# Patient Record
Sex: FEMALE | Race: WHITE | Employment: FULL TIME | URBAN - NONMETROPOLITAN AREA
[De-identification: names, ages, dates, MRNs, and addresses within clinical notes are randomized per-mention and may not be internally consistent; named-entity substitution may affect disease eponyms.]

---

## 2017-06-17 ENCOUNTER — HOSPITAL ENCOUNTER (EMERGENCY)
Age: 32
Discharge: HOME | End: 2017-06-17
Attending: EMERGENCY MEDICINE
Payer: COMMERCIAL

## 2017-06-17 VITALS
HEART RATE: 68 BPM | HEIGHT: 62 IN | SYSTOLIC BLOOD PRESSURE: 122 MMHG | WEIGHT: 130 LBS | BODY MASS INDEX: 23.92 KG/M2 | DIASTOLIC BLOOD PRESSURE: 87 MMHG | RESPIRATION RATE: 18 BRPM | OXYGEN SATURATION: 100 % | TEMPERATURE: 97.8 F

## 2017-06-17 DIAGNOSIS — S01.81XA FOREHEAD LACERATION, INITIAL ENCOUNTER: ICD-10-CM

## 2017-06-17 DIAGNOSIS — S09.90XA CLOSED HEAD INJURY, INITIAL ENCOUNTER: Primary | ICD-10-CM

## 2017-06-17 PROCEDURE — 6360000002 HC RX W HCPCS: Performed by: EMERGENCY MEDICINE

## 2017-06-17 PROCEDURE — 90715 TDAP VACCINE 7 YRS/> IM: CPT | Performed by: EMERGENCY MEDICINE

## 2017-06-17 PROCEDURE — 6370000000 HC RX 637 (ALT 250 FOR IP): Performed by: EMERGENCY MEDICINE

## 2017-06-17 PROCEDURE — 90471 IMMUNIZATION ADMIN: CPT | Performed by: EMERGENCY MEDICINE

## 2017-06-17 PROCEDURE — 12002 RPR S/N/AX/GEN/TRNK2.6-7.5CM: CPT

## 2017-06-17 PROCEDURE — 99282 EMERGENCY DEPT VISIT SF MDM: CPT

## 2017-06-17 RX ORDER — IBUPROFEN 600 MG/1
600 TABLET ORAL ONCE
Status: COMPLETED | OUTPATIENT
Start: 2017-06-17 | End: 2017-06-17

## 2017-06-17 RX ORDER — PROMETHAZINE HYDROCHLORIDE 25 MG/1
25 TABLET ORAL ONCE
Status: COMPLETED | OUTPATIENT
Start: 2017-06-17 | End: 2017-06-17

## 2017-06-17 RX ADMIN — IBUPROFEN 600 MG: 600 TABLET, FILM COATED ORAL at 02:29

## 2017-06-17 RX ADMIN — TETANUS TOXOID, REDUCED DIPHTHERIA TOXOID AND ACELLULAR PERTUSSIS VACCINE, ADSORBED 0.5 ML: 5; 2.5; 8; 8; 2.5 SUSPENSION INTRAMUSCULAR at 02:18

## 2017-06-17 RX ADMIN — PROMETHAZINE HYDROCHLORIDE 25 MG: 25 TABLET ORAL at 02:17

## 2017-06-17 ASSESSMENT — PAIN DESCRIPTION - DESCRIPTORS: DESCRIPTORS: BURNING;THROBBING

## 2017-06-17 ASSESSMENT — ENCOUNTER SYMPTOMS
GASTROINTESTINAL NEGATIVE: 1
ALLERGIC/IMMUNOLOGIC NEGATIVE: 1
EYES NEGATIVE: 1
RESPIRATORY NEGATIVE: 1

## 2017-06-17 ASSESSMENT — PAIN SCALES - GENERAL
PAINLEVEL_OUTOF10: 5
PAINLEVEL_OUTOF10: 5

## 2017-06-17 ASSESSMENT — PAIN DESCRIPTION - PAIN TYPE: TYPE: ACUTE PAIN

## 2017-06-17 ASSESSMENT — PAIN DESCRIPTION - LOCATION: LOCATION: HEAD

## 2017-06-22 ENCOUNTER — HOSPITAL ENCOUNTER (OUTPATIENT)
Dept: CT IMAGING | Age: 32
Discharge: HOME | End: 2017-06-22
Payer: COMMERCIAL

## 2017-06-22 DIAGNOSIS — R51.9 WORSENING HEADACHES: ICD-10-CM

## 2017-06-22 DIAGNOSIS — S09.90XA HEAD INJURY, INITIAL ENCOUNTER: ICD-10-CM

## 2017-06-22 PROCEDURE — 6360000004 HC RX CONTRAST MEDICATION: Performed by: NURSE PRACTITIONER

## 2017-06-22 PROCEDURE — 70470 CT HEAD/BRAIN W/O & W/DYE: CPT

## 2017-06-22 RX ADMIN — IOPAMIDOL 100 ML: 612 INJECTION, SOLUTION INTRAVENOUS at 15:09

## 2017-09-12 ENCOUNTER — OFFICE VISIT (OUTPATIENT)
Dept: OBGYN | Age: 32
End: 2017-09-12
Payer: COMMERCIAL

## 2017-09-12 VITALS
HEIGHT: 67 IN | BODY MASS INDEX: 21.5 KG/M2 | WEIGHT: 137 LBS | SYSTOLIC BLOOD PRESSURE: 130 MMHG | DIASTOLIC BLOOD PRESSURE: 75 MMHG

## 2017-09-12 DIAGNOSIS — F41.9 ANXIOUS MOOD: ICD-10-CM

## 2017-09-12 DIAGNOSIS — Z01.419 ROUTINE GYNECOLOGICAL EXAMINATION: Primary | ICD-10-CM

## 2017-09-12 PROCEDURE — 99395 PREV VISIT EST AGE 18-39: CPT | Performed by: ADVANCED PRACTICE MIDWIFE

## 2017-09-12 RX ORDER — SERTRALINE HYDROCHLORIDE 25 MG/1
25 TABLET, FILM COATED ORAL DAILY
Qty: 30 TABLET | Refills: 12 | Status: SHIPPED | OUTPATIENT
Start: 2017-09-12 | End: 2018-08-20 | Stop reason: CLARIF

## 2017-09-12 ASSESSMENT — PATIENT HEALTH QUESTIONNAIRE - PHQ9
SUM OF ALL RESPONSES TO PHQ QUESTIONS 1-9: 0
SUM OF ALL RESPONSES TO PHQ9 QUESTIONS 1 & 2: 0
2. FEELING DOWN, DEPRESSED OR HOPELESS: 0
1. LITTLE INTEREST OR PLEASURE IN DOING THINGS: 0

## 2018-05-10 ENCOUNTER — OFFICE VISIT (OUTPATIENT)
Dept: OBGYN | Age: 33
End: 2018-05-10
Payer: COMMERCIAL

## 2018-05-10 VITALS
DIASTOLIC BLOOD PRESSURE: 70 MMHG | HEIGHT: 63 IN | BODY MASS INDEX: 25.12 KG/M2 | WEIGHT: 141.8 LBS | SYSTOLIC BLOOD PRESSURE: 112 MMHG

## 2018-05-10 DIAGNOSIS — N63.0 BREAST MASS IN FEMALE: Primary | ICD-10-CM

## 2018-05-10 PROCEDURE — 99213 OFFICE O/P EST LOW 20 MIN: CPT | Performed by: ADVANCED PRACTICE MIDWIFE

## 2018-05-10 PROCEDURE — G8427 DOCREV CUR MEDS BY ELIG CLIN: HCPCS | Performed by: ADVANCED PRACTICE MIDWIFE

## 2018-05-10 PROCEDURE — 1036F TOBACCO NON-USER: CPT | Performed by: ADVANCED PRACTICE MIDWIFE

## 2018-05-10 PROCEDURE — G8417 CALC BMI ABV UP PARAM F/U: HCPCS | Performed by: ADVANCED PRACTICE MIDWIFE

## 2018-05-11 ENCOUNTER — HOSPITAL ENCOUNTER (OUTPATIENT)
Dept: ULTRASOUND IMAGING | Age: 33
Discharge: HOME | End: 2018-05-13
Payer: COMMERCIAL

## 2018-05-11 ENCOUNTER — HOSPITAL ENCOUNTER (OUTPATIENT)
Dept: WOMENS IMAGING | Age: 33
Discharge: HOME | End: 2018-05-13
Payer: COMMERCIAL

## 2018-05-11 DIAGNOSIS — N63.0 BREAST MASS IN FEMALE: ICD-10-CM

## 2018-05-11 PROCEDURE — 77065 DX MAMMO INCL CAD UNI: CPT

## 2018-05-11 PROCEDURE — 76641 ULTRASOUND BREAST COMPLETE: CPT

## 2018-06-27 RX ORDER — NORGESTIMATE AND ETHINYL ESTRADIOL 0.25-0.035
1 KIT ORAL DAILY
Qty: 1 PACKET | Refills: 12 | Status: SHIPPED | OUTPATIENT
Start: 2018-06-27 | End: 2018-08-20

## 2018-08-20 ENCOUNTER — HOSPITAL ENCOUNTER (OUTPATIENT)
Age: 33
Setting detail: SPECIMEN
Discharge: HOME | End: 2018-08-20
Payer: COMMERCIAL

## 2018-08-20 ENCOUNTER — INITIAL PRENATAL (OUTPATIENT)
Dept: OBGYN | Age: 33
End: 2018-08-20
Payer: COMMERCIAL

## 2018-08-20 VITALS — WEIGHT: 140 LBS | BODY MASS INDEX: 24.8 KG/M2 | SYSTOLIC BLOOD PRESSURE: 112 MMHG | DIASTOLIC BLOOD PRESSURE: 72 MMHG

## 2018-08-20 DIAGNOSIS — N91.2 AMENORRHEA: ICD-10-CM

## 2018-08-20 DIAGNOSIS — Z34.91 ENCOUNTER FOR SUPERVISION OF NORMAL PREGNANCY IN FIRST TRIMESTER, UNSPECIFIED GRAVIDITY: ICD-10-CM

## 2018-08-20 DIAGNOSIS — Z32.01 POSITIVE URINE PREGNANCY TEST: ICD-10-CM

## 2018-08-20 DIAGNOSIS — N91.2 AMENORRHEA: Primary | ICD-10-CM

## 2018-08-20 LAB
ABO/RH: NORMAL
AMPHETAMINE SCREEN URINE: NEGATIVE
ANTIBODY SCREEN: NEGATIVE
BARBITURATE SCREEN URINE: NEGATIVE
BENZODIAZEPINE SCREEN, URINE: NEGATIVE
BUPRENORPHINE URINE: NEGATIVE
CANNABINOID SCREEN URINE: NEGATIVE
COCAINE METABOLITE, URINE: NEGATIVE
CONTROL: ABNORMAL
MDMA URINE: NORMAL
METHADONE SCREEN, URINE: NEGATIVE
METHAMPHETAMINE, URINE: NEGATIVE
OPIATES, URINE: NEGATIVE
OXYCODONE SCREEN URINE: NEGATIVE
PHENCYCLIDINE, URINE: NEGATIVE
PREGNANCY TEST URINE, POC: POSITIVE
PROPOXYPHENE, URINE: NEGATIVE
TEST INFORMATION: NORMAL
TRICYCLIC ANTIDEPRESSANTS, UR: NEGATIVE

## 2018-08-20 PROCEDURE — 80306 DRUG TEST PRSMV INSTRMNT: CPT

## 2018-08-20 PROCEDURE — 85025 COMPLETE CBC W/AUTO DIFF WBC: CPT

## 2018-08-20 PROCEDURE — 87086 URINE CULTURE/COLONY COUNT: CPT

## 2018-08-20 PROCEDURE — 86762 RUBELLA ANTIBODY: CPT

## 2018-08-20 PROCEDURE — 87491 CHLMYD TRACH DNA AMP PROBE: CPT

## 2018-08-20 PROCEDURE — 86900 BLOOD TYPING SEROLOGIC ABO: CPT

## 2018-08-20 PROCEDURE — 87591 N.GONORRHOEAE DNA AMP PROB: CPT

## 2018-08-20 PROCEDURE — 86850 RBC ANTIBODY SCREEN: CPT

## 2018-08-20 PROCEDURE — 0500F INITIAL PRENATAL CARE VISIT: CPT | Performed by: ADVANCED PRACTICE MIDWIFE

## 2018-08-20 PROCEDURE — 86803 HEPATITIS C AB TEST: CPT

## 2018-08-20 PROCEDURE — 87340 HEPATITIS B SURFACE AG IA: CPT

## 2018-08-20 PROCEDURE — 86780 TREPONEMA PALLIDUM: CPT

## 2018-08-20 PROCEDURE — 87389 HIV-1 AG W/HIV-1&-2 AB AG IA: CPT

## 2018-08-20 PROCEDURE — 86901 BLOOD TYPING SEROLOGIC RH(D): CPT

## 2018-08-20 PROCEDURE — 36415 COLL VENOUS BLD VENIPUNCTURE: CPT | Performed by: ADVANCED PRACTICE MIDWIFE

## 2018-08-20 ASSESSMENT — PATIENT HEALTH QUESTIONNAIRE - PHQ9
2. FEELING DOWN, DEPRESSED OR HOPELESS: 0
SUM OF ALL RESPONSES TO PHQ9 QUESTIONS 1 & 2: 0
SUM OF ALL RESPONSES TO PHQ QUESTIONS 1-9: 0
1. LITTLE INTEREST OR PLEASURE IN DOING THINGS: 0
SUM OF ALL RESPONSES TO PHQ QUESTIONS 1-9: 0

## 2018-08-21 LAB
ABSOLUTE EOS #: 0.08 K/UL (ref 0–0.44)
ABSOLUTE IMMATURE GRANULOCYTE: <0.03 K/UL (ref 0–0.3)
ABSOLUTE LYMPH #: 1.76 K/UL (ref 1.1–3.7)
ABSOLUTE MONO #: 0.75 K/UL (ref 0.1–1.2)
BASOPHILS # BLD: 0 % (ref 0–2)
BASOPHILS ABSOLUTE: <0.03 K/UL (ref 0–0.2)
CULTURE: NORMAL
DIFFERENTIAL TYPE: NORMAL
EOSINOPHILS RELATIVE PERCENT: 1 % (ref 1–4)
HCT VFR BLD CALC: 37.6 % (ref 36.3–47.1)
HEMOGLOBIN: 12.5 G/DL (ref 11.9–15.1)
HEPATITIS B SURFACE ANTIGEN: NONREACTIVE
HEPATITIS C ANTIBODY: NONREACTIVE
HIV AG/AB: NONREACTIVE
IMMATURE GRANULOCYTES: 0 %
LYMPHOCYTES # BLD: 24 % (ref 24–43)
Lab: NORMAL
MCH RBC QN AUTO: 30.9 PG (ref 25.2–33.5)
MCHC RBC AUTO-ENTMCNC: 33.2 G/DL (ref 28.4–34.8)
MCV RBC AUTO: 93.1 FL (ref 82.6–102.9)
MONOCYTES # BLD: 10 % (ref 3–12)
NRBC AUTOMATED: 0 PER 100 WBC
PDW BLD-RTO: 11.9 % (ref 11.8–14.4)
PLATELET # BLD: 228 K/UL (ref 138–453)
PLATELET ESTIMATE: NORMAL
PMV BLD AUTO: 11.7 FL (ref 8.1–13.5)
RBC # BLD: 4.04 M/UL (ref 3.95–5.11)
RBC # BLD: NORMAL 10*6/UL
RUBV IGG SER QL: 170.9 IU/ML
SEG NEUTROPHILS: 65 % (ref 36–65)
SEGMENTED NEUTROPHILS ABSOLUTE COUNT: 4.7 K/UL (ref 1.5–8.1)
SPECIMEN DESCRIPTION: NORMAL
STATUS: NORMAL
T. PALLIDUM, IGG: NONREACTIVE
WBC # BLD: 7.3 K/UL (ref 3.5–11.3)
WBC # BLD: NORMAL 10*3/UL

## 2018-08-22 LAB
C. TRACHOMATIS DNA ,URINE: NEGATIVE
N. GONORRHOEAE DNA, URINE: NEGATIVE

## 2018-09-04 ENCOUNTER — PROCEDURE VISIT (OUTPATIENT)
Dept: OBGYN | Age: 33
End: 2018-09-04
Payer: COMMERCIAL

## 2018-09-04 ENCOUNTER — HOSPITAL ENCOUNTER (OUTPATIENT)
Age: 33
Setting detail: SPECIMEN
Discharge: HOME | End: 2018-09-04
Payer: COMMERCIAL

## 2018-09-04 ENCOUNTER — ROUTINE PRENATAL (OUTPATIENT)
Dept: OBGYN | Age: 33
End: 2018-09-04
Payer: COMMERCIAL

## 2018-09-04 VITALS — WEIGHT: 140 LBS | BODY MASS INDEX: 24.8 KG/M2 | DIASTOLIC BLOOD PRESSURE: 82 MMHG | SYSTOLIC BLOOD PRESSURE: 130 MMHG

## 2018-09-04 DIAGNOSIS — O20.0 THREATENED ABORTION: ICD-10-CM

## 2018-09-04 DIAGNOSIS — Z32.01 POSITIVE PREGNANCY TEST: ICD-10-CM

## 2018-09-04 DIAGNOSIS — O20.0 THREATENED ABORTION: Primary | ICD-10-CM

## 2018-09-04 DIAGNOSIS — N91.2 AMENORRHEA: Primary | ICD-10-CM

## 2018-09-04 LAB
ABDOMINAL CIRCUMFERENCE: ABNORMAL CM
BIPARIETAL DIAMETER: ABNORMAL CM
ESTIMATED FETAL WEIGHT: ABNORMAL GRAMS
FEMORAL DIAMETER: ABNORMAL CM
HC/AC: ABNORMAL
HCG QUANTITATIVE: ABNORMAL IU/L
HEAD CIRCUMFERENCE: ABNORMAL CM

## 2018-09-04 PROCEDURE — 84702 CHORIONIC GONADOTROPIN TEST: CPT

## 2018-09-04 PROCEDURE — 36415 COLL VENOUS BLD VENIPUNCTURE: CPT | Performed by: ADVANCED PRACTICE MIDWIFE

## 2018-09-04 PROCEDURE — 0502F SUBSEQUENT PRENATAL CARE: CPT | Performed by: ADVANCED PRACTICE MIDWIFE

## 2018-09-04 PROCEDURE — 76817 TRANSVAGINAL US OBSTETRIC: CPT | Performed by: OBSTETRICS & GYNECOLOGY

## 2018-09-06 ENCOUNTER — HOSPITAL ENCOUNTER (OUTPATIENT)
Dept: LAB | Age: 33
Discharge: HOME | End: 2018-09-06
Payer: COMMERCIAL

## 2018-09-06 DIAGNOSIS — O20.0 THREATENED ABORTION: ICD-10-CM

## 2018-09-06 LAB — HCG QUANTITATIVE: ABNORMAL IU/L

## 2018-09-06 PROCEDURE — 84702 CHORIONIC GONADOTROPIN TEST: CPT

## 2018-09-06 PROCEDURE — 36415 COLL VENOUS BLD VENIPUNCTURE: CPT

## 2018-09-12 ENCOUNTER — ROUTINE PRENATAL (OUTPATIENT)
Dept: OBGYN | Age: 33
End: 2018-09-12
Payer: COMMERCIAL

## 2018-09-12 VITALS — BODY MASS INDEX: 24.8 KG/M2 | SYSTOLIC BLOOD PRESSURE: 126 MMHG | DIASTOLIC BLOOD PRESSURE: 78 MMHG | WEIGHT: 140 LBS

## 2018-09-12 DIAGNOSIS — O20.0 THREATENED ABORTION: Primary | ICD-10-CM

## 2018-09-12 DIAGNOSIS — Z3A.01 6 WEEKS GESTATION OF PREGNANCY: ICD-10-CM

## 2018-09-12 LAB
ABDOMINAL CIRCUMFERENCE: NORMAL CM
BIPARIETAL DIAMETER: NORMAL CM
ESTIMATED FETAL WEIGHT: NORMAL GRAMS
FEMORAL DIAMETER: NORMAL CM
HC/AC: NORMAL
HEAD CIRCUMFERENCE: NORMAL CM

## 2018-09-12 PROCEDURE — 76815 OB US LIMITED FETUS(S): CPT | Performed by: OBSTETRICS & GYNECOLOGY

## 2018-09-12 PROCEDURE — 0502F SUBSEQUENT PRENATAL CARE: CPT | Performed by: ADVANCED PRACTICE MIDWIFE

## 2018-09-17 ENCOUNTER — ROUTINE PRENATAL (OUTPATIENT)
Dept: OBGYN | Age: 33
End: 2018-09-17
Payer: COMMERCIAL

## 2018-09-17 VITALS — DIASTOLIC BLOOD PRESSURE: 82 MMHG | BODY MASS INDEX: 24.8 KG/M2 | SYSTOLIC BLOOD PRESSURE: 128 MMHG | WEIGHT: 140 LBS

## 2018-09-17 DIAGNOSIS — O20.0 THREATENED ABORTION: Primary | ICD-10-CM

## 2018-09-17 DIAGNOSIS — O02.1 MISSED AB: ICD-10-CM

## 2018-09-17 LAB
ABDOMINAL CIRCUMFERENCE: ABNORMAL CM
BIPARIETAL DIAMETER: ABNORMAL CM
ESTIMATED FETAL WEIGHT: ABNORMAL GRAMS
FEMORAL DIAMETER: ABNORMAL CM
HC/AC: ABNORMAL
HEAD CIRCUMFERENCE: ABNORMAL CM

## 2018-09-17 PROCEDURE — 76815 OB US LIMITED FETUS(S): CPT | Performed by: OBSTETRICS & GYNECOLOGY

## 2018-09-17 PROCEDURE — 99212 OFFICE O/P EST SF 10 MIN: CPT | Performed by: ADVANCED PRACTICE MIDWIFE

## 2018-09-18 ENCOUNTER — TELEPHONE (OUTPATIENT)
Dept: OBGYN | Age: 33
End: 2018-09-18

## 2018-09-18 DIAGNOSIS — O02.1 MISSED AB: Primary | ICD-10-CM

## 2018-09-18 NOTE — TELEPHONE ENCOUNTER
Pt called and decided that she does want to have a D&C, pt has an appt with you on 9/24/18 @ 1:20p for a F/U SAB

## 2018-09-18 NOTE — TELEPHONE ENCOUNTER
This was supposed to be getting scheduled by ERNESTO Horner; I told Jamey Leader about it last night.   Can we follow up and find out what is going on

## 2018-10-09 ENCOUNTER — OFFICE VISIT (OUTPATIENT)
Dept: OBGYN | Age: 33
End: 2018-10-09

## 2018-10-09 VITALS
BODY MASS INDEX: 26.13 KG/M2 | WEIGHT: 142 LBS | DIASTOLIC BLOOD PRESSURE: 76 MMHG | SYSTOLIC BLOOD PRESSURE: 118 MMHG | HEIGHT: 62 IN

## 2018-10-09 DIAGNOSIS — O02.1 MISSED AB: Primary | ICD-10-CM

## 2018-10-09 PROCEDURE — 99024 POSTOP FOLLOW-UP VISIT: CPT | Performed by: OBSTETRICS & GYNECOLOGY

## 2018-10-19 ENCOUNTER — HOSPITAL ENCOUNTER (OUTPATIENT)
Dept: CT IMAGING | Age: 33
Discharge: HOME | End: 2018-10-21
Payer: COMMERCIAL

## 2018-10-19 ENCOUNTER — HOSPITAL ENCOUNTER (OUTPATIENT)
Age: 33
Setting detail: SPECIMEN
Discharge: HOME | End: 2018-10-19
Payer: COMMERCIAL

## 2018-10-19 DIAGNOSIS — R10.9 ABDOMINAL PAIN, UNSPECIFIED ABDOMINAL LOCATION: ICD-10-CM

## 2018-10-19 LAB
-: NORMAL
ABSOLUTE EOS #: 0.1 K/UL (ref 0–0.44)
ABSOLUTE IMMATURE GRANULOCYTE: <0.03 K/UL (ref 0–0.3)
ABSOLUTE LYMPH #: 2.41 K/UL (ref 1.1–3.7)
ABSOLUTE MONO #: 0.91 K/UL (ref 0.1–1.2)
ALBUMIN SERPL-MCNC: 4.8 G/DL (ref 3.5–5.2)
ALBUMIN/GLOBULIN RATIO: 1.9 (ref 1–2.5)
ALP BLD-CCNC: 67 U/L (ref 35–104)
ALT SERPL-CCNC: 14 U/L (ref 5–33)
AMORPHOUS: NORMAL
AMYLASE: 42 U/L (ref 28–100)
ANION GAP SERPL CALCULATED.3IONS-SCNC: 12 MMOL/L (ref 9–17)
AST SERPL-CCNC: 21 U/L
BACTERIA: NORMAL
BASOPHILS # BLD: 1 % (ref 0–2)
BASOPHILS ABSOLUTE: 0.04 K/UL (ref 0–0.2)
BILIRUB SERPL-MCNC: 0.54 MG/DL (ref 0.3–1.2)
BILIRUBIN URINE: NEGATIVE
BUN BLDV-MCNC: 16 MG/DL (ref 6–20)
BUN/CREAT BLD: 26 (ref 9–20)
CALCIUM SERPL-MCNC: 9.3 MG/DL (ref 8.6–10.4)
CASTS UA: NORMAL /LPF
CHLORIDE BLD-SCNC: 103 MMOL/L (ref 98–107)
CO2: 25 MMOL/L (ref 20–31)
COLOR: YELLOW
COMMENT UA: NORMAL
CREAT SERPL-MCNC: 0.62 MG/DL (ref 0.5–0.9)
CRYSTALS, UA: NORMAL /HPF
DIFFERENTIAL TYPE: NORMAL
DIRECT EXAM: NORMAL
EOSINOPHILS RELATIVE PERCENT: 1 % (ref 1–4)
EPITHELIAL CELLS UA: NORMAL /HPF (ref 0–25)
GFR AFRICAN AMERICAN: >60 ML/MIN
GFR NON-AFRICAN AMERICAN: >60 ML/MIN
GFR SERPL CREATININE-BSD FRML MDRD: ABNORMAL ML/MIN/{1.73_M2}
GFR SERPL CREATININE-BSD FRML MDRD: ABNORMAL ML/MIN/{1.73_M2}
GLUCOSE BLD-MCNC: 95 MG/DL (ref 70–99)
GLUCOSE URINE: NEGATIVE
HCT VFR BLD CALC: 39.7 % (ref 36.3–47.1)
HEMOGLOBIN: 12.9 G/DL (ref 11.9–15.1)
IMMATURE GRANULOCYTES: 0 %
KETONES, URINE: NEGATIVE
LEUKOCYTE ESTERASE, URINE: NEGATIVE
LIPASE: 35 U/L (ref 13–60)
LYMPHOCYTES # BLD: 28 % (ref 24–43)
Lab: NORMAL
MCH RBC QN AUTO: 31.1 PG (ref 25.2–33.5)
MCHC RBC AUTO-ENTMCNC: 32.5 G/DL (ref 28.4–34.8)
MCV RBC AUTO: 95.7 FL (ref 82.6–102.9)
MONOCYTES # BLD: 11 % (ref 3–12)
MUCUS: NORMAL
NITRITE, URINE: NEGATIVE
NRBC AUTOMATED: 0 PER 100 WBC
OTHER OBSERVATIONS UA: NORMAL
PDW BLD-RTO: 12.7 % (ref 11.8–14.4)
PH UA: 8 (ref 5–9)
PLATELET # BLD: 211 K/UL (ref 138–453)
PLATELET ESTIMATE: NORMAL
PMV BLD AUTO: 10.6 FL (ref 8.1–13.5)
POTASSIUM SERPL-SCNC: 4.1 MMOL/L (ref 3.7–5.3)
PROTEIN UA: NEGATIVE
RBC # BLD: 4.15 M/UL (ref 3.95–5.11)
RBC # BLD: NORMAL 10*6/UL
RBC UA: NORMAL /HPF (ref 0–2)
RENAL EPITHELIAL, UA: NORMAL /HPF
SEG NEUTROPHILS: 59 % (ref 36–65)
SEGMENTED NEUTROPHILS ABSOLUTE COUNT: 5.13 K/UL (ref 1.5–8.1)
SODIUM BLD-SCNC: 140 MMOL/L (ref 135–144)
SPECIFIC GRAVITY UA: 1.01 (ref 1.01–1.02)
SPECIMEN DESCRIPTION: NORMAL
STATUS: NORMAL
T. PALLIDUM, IGG: NONREACTIVE
TOTAL PROTEIN: 7.3 G/DL (ref 6.4–8.3)
TRICHOMONAS: NORMAL
TURBIDITY: CLEAR
URINE HGB: NEGATIVE
UROBILINOGEN, URINE: NORMAL
WBC # BLD: 8.6 K/UL (ref 3.5–11.3)
WBC # BLD: NORMAL 10*3/UL
WBC UA: NORMAL /HPF (ref 0–5)
YEAST: NORMAL

## 2018-10-19 PROCEDURE — 80053 COMPREHEN METABOLIC PANEL: CPT

## 2018-10-19 PROCEDURE — 87491 CHLMYD TRACH DNA AMP PROBE: CPT

## 2018-10-19 PROCEDURE — 87591 N.GONORRHOEAE DNA AMP PROB: CPT

## 2018-10-19 PROCEDURE — 74178 CT ABD&PLV WO CNTR FLWD CNTR: CPT

## 2018-10-19 PROCEDURE — 36415 COLL VENOUS BLD VENIPUNCTURE: CPT

## 2018-10-19 PROCEDURE — 85025 COMPLETE CBC W/AUTO DIFF WBC: CPT

## 2018-10-19 PROCEDURE — 87086 URINE CULTURE/COLONY COUNT: CPT

## 2018-10-19 PROCEDURE — 82150 ASSAY OF AMYLASE: CPT

## 2018-10-19 PROCEDURE — 86780 TREPONEMA PALLIDUM: CPT

## 2018-10-19 PROCEDURE — 83690 ASSAY OF LIPASE: CPT

## 2018-10-19 PROCEDURE — 87070 CULTURE OTHR SPECIMN AEROBIC: CPT

## 2018-10-19 PROCEDURE — 6360000004 HC RX CONTRAST MEDICATION: Performed by: NURSE PRACTITIONER

## 2018-10-19 PROCEDURE — 81001 URINALYSIS AUTO W/SCOPE: CPT

## 2018-10-19 PROCEDURE — 87210 SMEAR WET MOUNT SALINE/INK: CPT

## 2018-10-19 RX ADMIN — IOHEXOL 50 ML: 240 INJECTION, SOLUTION INTRATHECAL; INTRAVASCULAR; INTRAVENOUS; ORAL at 16:49

## 2018-10-19 RX ADMIN — IOPAMIDOL 75 ML: 755 INJECTION, SOLUTION INTRAVENOUS at 17:48

## 2018-10-20 LAB
CULTURE: NORMAL
Lab: NORMAL
SPECIMEN DESCRIPTION: NORMAL
STATUS: NORMAL

## 2018-10-22 LAB
C. TRACHOMATIS DNA ,URINE: NEGATIVE
CULTURE: ABNORMAL
Lab: ABNORMAL
N. GONORRHOEAE DNA, URINE: NEGATIVE
SPECIMEN DESCRIPTION: ABNORMAL
STATUS: ABNORMAL

## 2018-12-06 ENCOUNTER — HOSPITAL ENCOUNTER (OUTPATIENT)
Age: 33
Discharge: HOME | End: 2018-12-06
Payer: COMMERCIAL

## 2018-12-06 DIAGNOSIS — O20.0 THREATENED ABORTION: Primary | ICD-10-CM

## 2018-12-06 DIAGNOSIS — O20.0 THREATENED ABORTION: ICD-10-CM

## 2018-12-06 LAB — HCG QUANTITATIVE: ABNORMAL IU/L

## 2018-12-06 PROCEDURE — 36415 COLL VENOUS BLD VENIPUNCTURE: CPT

## 2018-12-06 PROCEDURE — 84702 CHORIONIC GONADOTROPIN TEST: CPT

## 2018-12-10 ENCOUNTER — INITIAL PRENATAL (OUTPATIENT)
Dept: OBGYN | Age: 33
End: 2018-12-10
Payer: COMMERCIAL

## 2018-12-10 ENCOUNTER — HOSPITAL ENCOUNTER (OUTPATIENT)
Age: 33
Setting detail: SPECIMEN
Discharge: HOME | End: 2018-12-10
Payer: COMMERCIAL

## 2018-12-10 VITALS — DIASTOLIC BLOOD PRESSURE: 70 MMHG | WEIGHT: 142.6 LBS | BODY MASS INDEX: 26.08 KG/M2 | SYSTOLIC BLOOD PRESSURE: 112 MMHG

## 2018-12-10 DIAGNOSIS — Z32.01 POSITIVE URINE PREGNANCY TEST: ICD-10-CM

## 2018-12-10 DIAGNOSIS — Z3A.01 6 WEEKS GESTATION OF PREGNANCY: ICD-10-CM

## 2018-12-10 DIAGNOSIS — O36.80X0 CONFIRM FETAL VIABILITY WITH HISTORY OF MISCARRIAGE, ULTRASOUND: ICD-10-CM

## 2018-12-10 DIAGNOSIS — Z34.81 ENCOUNTER FOR SUPERVISION OF OTHER NORMAL PREGNANCY IN FIRST TRIMESTER: ICD-10-CM

## 2018-12-10 DIAGNOSIS — N91.2 AMENORRHEA: ICD-10-CM

## 2018-12-10 DIAGNOSIS — Z87.59 CONFIRM FETAL VIABILITY WITH HISTORY OF MISCARRIAGE, ULTRASOUND: ICD-10-CM

## 2018-12-10 DIAGNOSIS — N91.2 AMENORRHEA: Primary | ICD-10-CM

## 2018-12-10 LAB
ABO/RH: NORMAL
ABSOLUTE EOS #: 0.07 K/UL (ref 0–0.44)
ABSOLUTE IMMATURE GRANULOCYTE: <0.03 K/UL (ref 0–0.3)
ABSOLUTE LYMPH #: 2.03 K/UL (ref 1.1–3.7)
ABSOLUTE MONO #: 0.73 K/UL (ref 0.1–1.2)
AMPHETAMINE SCREEN URINE: NEGATIVE
ANTIBODY SCREEN: NEGATIVE
BARBITURATE SCREEN URINE: NEGATIVE
BASOPHILS # BLD: 0 % (ref 0–2)
BASOPHILS ABSOLUTE: <0.03 K/UL (ref 0–0.2)
BENZODIAZEPINE SCREEN, URINE: NEGATIVE
BUPRENORPHINE URINE: NEGATIVE
CANNABINOID SCREEN URINE: NEGATIVE
COCAINE METABOLITE, URINE: NEGATIVE
CONTROL: ABNORMAL
CRL: 0.48 CM
DIFFERENTIAL TYPE: ABNORMAL
EOSINOPHILS RELATIVE PERCENT: 1 % (ref 1–4)
HCT VFR BLD CALC: 37.6 % (ref 36.3–47.1)
HEMOGLOBIN: 12.5 G/DL (ref 11.9–15.1)
HEPATITIS B SURFACE ANTIGEN: NONREACTIVE
HEPATITIS C ANTIBODY: NONREACTIVE
HIV AG/AB: NONREACTIVE
IMMATURE GRANULOCYTES: 0 %
LYMPHOCYTES # BLD: 24 % (ref 24–43)
MCH RBC QN AUTO: 30.9 PG (ref 25.2–33.5)
MCHC RBC AUTO-ENTMCNC: 33.2 G/DL (ref 28.4–34.8)
MCV RBC AUTO: 93.1 FL (ref 82.6–102.9)
MDMA URINE: NORMAL
METHADONE SCREEN, URINE: NEGATIVE
METHAMPHETAMINE, URINE: NEGATIVE
MONOCYTES # BLD: 9 % (ref 3–12)
NRBC AUTOMATED: 0 PER 100 WBC
OPIATES, URINE: NEGATIVE
OXYCODONE SCREEN URINE: NEGATIVE
PDW BLD-RTO: 11.6 % (ref 11.8–14.4)
PHENCYCLIDINE, URINE: NEGATIVE
PLATELET # BLD: 245 K/UL (ref 138–453)
PLATELET ESTIMATE: ABNORMAL
PMV BLD AUTO: 11.2 FL (ref 8.1–13.5)
PREGNANCY TEST URINE, POC: POSITIVE
PROPOXYPHENE, URINE: NEGATIVE
RBC # BLD: 4.04 M/UL (ref 3.95–5.11)
RBC # BLD: ABNORMAL 10*6/UL
RUBV IGG SER QL: 179.8 IU/ML
SAC DIAMETER: NORMAL CM
SEG NEUTROPHILS: 66 % (ref 36–65)
SEGMENTED NEUTROPHILS ABSOLUTE COUNT: 5.61 K/UL (ref 1.5–8.1)
T. PALLIDUM, IGG: NONREACTIVE
TEST INFORMATION: NORMAL
TRICYCLIC ANTIDEPRESSANTS, UR: NEGATIVE
WBC # BLD: 8.5 K/UL (ref 3.5–11.3)
WBC # BLD: ABNORMAL 10*3/UL

## 2018-12-10 PROCEDURE — 87340 HEPATITIS B SURFACE AG IA: CPT

## 2018-12-10 PROCEDURE — 86762 RUBELLA ANTIBODY: CPT

## 2018-12-10 PROCEDURE — 86850 RBC ANTIBODY SCREEN: CPT

## 2018-12-10 PROCEDURE — 80306 DRUG TEST PRSMV INSTRMNT: CPT

## 2018-12-10 PROCEDURE — 86900 BLOOD TYPING SEROLOGIC ABO: CPT

## 2018-12-10 PROCEDURE — 36415 COLL VENOUS BLD VENIPUNCTURE: CPT | Performed by: ADVANCED PRACTICE MIDWIFE

## 2018-12-10 PROCEDURE — 86803 HEPATITIS C AB TEST: CPT

## 2018-12-10 PROCEDURE — 87591 N.GONORRHOEAE DNA AMP PROB: CPT

## 2018-12-10 PROCEDURE — 76817 TRANSVAGINAL US OBSTETRIC: CPT | Performed by: OBSTETRICS & GYNECOLOGY

## 2018-12-10 PROCEDURE — 87389 HIV-1 AG W/HIV-1&-2 AB AG IA: CPT

## 2018-12-10 PROCEDURE — 86901 BLOOD TYPING SEROLOGIC RH(D): CPT

## 2018-12-10 PROCEDURE — 85025 COMPLETE CBC W/AUTO DIFF WBC: CPT

## 2018-12-10 PROCEDURE — 87491 CHLMYD TRACH DNA AMP PROBE: CPT

## 2018-12-10 PROCEDURE — 86780 TREPONEMA PALLIDUM: CPT

## 2018-12-10 PROCEDURE — 87086 URINE CULTURE/COLONY COUNT: CPT

## 2018-12-10 ASSESSMENT — PATIENT HEALTH QUESTIONNAIRE - PHQ9
SUM OF ALL RESPONSES TO PHQ QUESTIONS 1-9: 0
SUM OF ALL RESPONSES TO PHQ9 QUESTIONS 1 & 2: 0
SUM OF ALL RESPONSES TO PHQ QUESTIONS 1-9: 0
2. FEELING DOWN, DEPRESSED OR HOPELESS: 0
1. LITTLE INTEREST OR PLEASURE IN DOING THINGS: 0

## 2018-12-11 LAB
C. TRACHOMATIS DNA ,URINE: NEGATIVE
CULTURE: NORMAL
Lab: NORMAL
N. GONORRHOEAE DNA, URINE: NEGATIVE
SPECIMEN DESCRIPTION: NORMAL
STATUS: NORMAL

## 2018-12-31 ENCOUNTER — PROCEDURE VISIT (OUTPATIENT)
Dept: OBGYN | Age: 33
End: 2018-12-31
Payer: COMMERCIAL

## 2018-12-31 DIAGNOSIS — Z87.59 ULTRASOUND SCAN TO CONFIRM FETAL VIABILITY WITH HISTORY OF MISCARRIAGE: Primary | ICD-10-CM

## 2018-12-31 DIAGNOSIS — Z3A.09 9 WEEKS GESTATION OF PREGNANCY: ICD-10-CM

## 2018-12-31 DIAGNOSIS — O36.80X0 ULTRASOUND SCAN TO CONFIRM FETAL VIABILITY WITH HISTORY OF MISCARRIAGE: Primary | ICD-10-CM

## 2018-12-31 LAB
CRL: 2.43 CM
SAC DIAMETER: NORMAL CM

## 2018-12-31 PROCEDURE — 76801 OB US < 14 WKS SINGLE FETUS: CPT | Performed by: OBSTETRICS & GYNECOLOGY

## 2019-01-08 ENCOUNTER — HOSPITAL ENCOUNTER (EMERGENCY)
Age: 34
Discharge: HOME | End: 2019-01-08
Attending: EMERGENCY MEDICINE
Payer: COMMERCIAL

## 2019-01-08 ENCOUNTER — TELEPHONE (OUTPATIENT)
Dept: OBGYN | Age: 34
End: 2019-01-08

## 2019-01-08 VITALS
HEART RATE: 90 BPM | OXYGEN SATURATION: 97 % | SYSTOLIC BLOOD PRESSURE: 117 MMHG | DIASTOLIC BLOOD PRESSURE: 79 MMHG | TEMPERATURE: 98.5 F | RESPIRATION RATE: 14 BRPM

## 2019-01-08 DIAGNOSIS — J11.1 INFLUENZA-LIKE ILLNESS: Primary | ICD-10-CM

## 2019-01-08 DIAGNOSIS — R11.11 VOMITING WITHOUT NAUSEA, INTRACTABILITY OF VOMITING NOT SPECIFIED, UNSPECIFIED VOMITING TYPE: ICD-10-CM

## 2019-01-08 LAB
-: ABNORMAL
ABSOLUTE EOS #: 0 K/UL (ref 0–0.44)
ABSOLUTE IMMATURE GRANULOCYTE: 0 K/UL (ref 0–0.3)
ABSOLUTE LYMPH #: 0.28 K/UL (ref 1.1–3.7)
ABSOLUTE MONO #: 0.69 K/UL (ref 0.1–1.2)
ALBUMIN SERPL-MCNC: 3.9 G/DL (ref 3.5–5.2)
ALBUMIN/GLOBULIN RATIO: 1.3 (ref 1–2.5)
ALP BLD-CCNC: 48 U/L (ref 35–104)
ALT SERPL-CCNC: 26 U/L (ref 5–33)
AMORPHOUS: ABNORMAL
ANION GAP SERPL CALCULATED.3IONS-SCNC: 15 MMOL/L (ref 9–17)
AST SERPL-CCNC: 23 U/L
BACTERIA: ABNORMAL
BASOPHILS # BLD: 0 % (ref 0–2)
BASOPHILS ABSOLUTE: 0 K/UL (ref 0–0.2)
BILIRUB SERPL-MCNC: 0.76 MG/DL (ref 0.3–1.2)
BILIRUBIN URINE: NEGATIVE
BUN BLDV-MCNC: 11 MG/DL (ref 6–20)
BUN/CREAT BLD: 26 (ref 9–20)
CALCIUM SERPL-MCNC: 8.6 MG/DL (ref 8.6–10.4)
CASTS UA: ABNORMAL /LPF
CHLORIDE BLD-SCNC: 105 MMOL/L (ref 98–107)
CO2: 19 MMOL/L (ref 20–31)
COLOR: YELLOW
COMMENT UA: ABNORMAL
CREAT SERPL-MCNC: 0.43 MG/DL (ref 0.5–0.9)
CRYSTALS, UA: ABNORMAL /HPF
DIFFERENTIAL TYPE: ABNORMAL
DIRECT EXAM: NORMAL
EOSINOPHILS RELATIVE PERCENT: 0 % (ref 1–4)
EPITHELIAL CELLS UA: ABNORMAL /HPF (ref 0–25)
GFR AFRICAN AMERICAN: >60 ML/MIN
GFR NON-AFRICAN AMERICAN: >60 ML/MIN
GFR SERPL CREATININE-BSD FRML MDRD: ABNORMAL ML/MIN/{1.73_M2}
GFR SERPL CREATININE-BSD FRML MDRD: ABNORMAL ML/MIN/{1.73_M2}
GLUCOSE BLD-MCNC: 98 MG/DL (ref 70–99)
GLUCOSE URINE: NEGATIVE
HCT VFR BLD CALC: 38.4 % (ref 36.3–47.1)
HEMOGLOBIN: 13.2 G/DL (ref 11.9–15.1)
IMMATURE GRANULOCYTES: 0 %
KETONES, URINE: ABNORMAL
LEUKOCYTE ESTERASE, URINE: NEGATIVE
LYMPHOCYTES # BLD: 2 % (ref 24–43)
Lab: NORMAL
MCH RBC QN AUTO: 31.1 PG (ref 25.2–33.5)
MCHC RBC AUTO-ENTMCNC: 34.4 G/DL (ref 28.4–34.8)
MCV RBC AUTO: 90.4 FL (ref 82.6–102.9)
MONOCYTES # BLD: 5 % (ref 3–12)
MORPHOLOGY: NORMAL
MUCUS: ABNORMAL
NITRITE, URINE: NEGATIVE
NRBC AUTOMATED: 0 PER 100 WBC
OTHER OBSERVATIONS UA: ABNORMAL
PDW BLD-RTO: 11.9 % (ref 11.8–14.4)
PH UA: 5.5 (ref 5–9)
PLATELET # BLD: 197 K/UL (ref 138–453)
PLATELET ESTIMATE: ABNORMAL
PMV BLD AUTO: 10.8 FL (ref 8.1–13.5)
POTASSIUM SERPL-SCNC: 3.7 MMOL/L (ref 3.7–5.3)
PROTEIN UA: ABNORMAL
RBC # BLD: 4.25 M/UL (ref 3.95–5.11)
RBC # BLD: ABNORMAL 10*6/UL
RBC UA: ABNORMAL /HPF (ref 0–2)
RENAL EPITHELIAL, UA: ABNORMAL /HPF
SEG NEUTROPHILS: 93 % (ref 36–65)
SEGMENTED NEUTROPHILS ABSOLUTE COUNT: 12.83 K/UL (ref 1.5–8.1)
SODIUM BLD-SCNC: 139 MMOL/L (ref 135–144)
SPECIFIC GRAVITY UA: >1.03 (ref 1.01–1.02)
SPECIMEN DESCRIPTION: NORMAL
STATUS: NORMAL
TOTAL PROTEIN: 6.8 G/DL (ref 6.4–8.3)
TRICHOMONAS: ABNORMAL
TURBIDITY: CLEAR
URINE HGB: NEGATIVE
UROBILINOGEN, URINE: NORMAL
WBC # BLD: 13.8 K/UL (ref 3.5–11.3)
WBC # BLD: ABNORMAL 10*3/UL
WBC UA: ABNORMAL /HPF (ref 0–5)
YEAST: ABNORMAL

## 2019-01-08 PROCEDURE — 81001 URINALYSIS AUTO W/SCOPE: CPT

## 2019-01-08 PROCEDURE — 87804 INFLUENZA ASSAY W/OPTIC: CPT

## 2019-01-08 PROCEDURE — 6360000002 HC RX W HCPCS: Performed by: EMERGENCY MEDICINE

## 2019-01-08 PROCEDURE — 99283 EMERGENCY DEPT VISIT LOW MDM: CPT

## 2019-01-08 PROCEDURE — 2580000003 HC RX 258: Performed by: EMERGENCY MEDICINE

## 2019-01-08 PROCEDURE — 36415 COLL VENOUS BLD VENIPUNCTURE: CPT

## 2019-01-08 PROCEDURE — 80053 COMPREHEN METABOLIC PANEL: CPT

## 2019-01-08 PROCEDURE — 96375 TX/PRO/DX INJ NEW DRUG ADDON: CPT

## 2019-01-08 PROCEDURE — 85025 COMPLETE CBC W/AUTO DIFF WBC: CPT

## 2019-01-08 PROCEDURE — 96365 THER/PROPH/DIAG IV INF INIT: CPT

## 2019-01-08 PROCEDURE — 96366 THER/PROPH/DIAG IV INF ADDON: CPT

## 2019-01-08 RX ORDER — ONDANSETRON 2 MG/ML
4 INJECTION INTRAMUSCULAR; INTRAVENOUS ONCE
Status: COMPLETED | OUTPATIENT
Start: 2019-01-08 | End: 2019-01-08

## 2019-01-08 RX ORDER — DEXTROSE AND SODIUM CHLORIDE 5; .45 G/100ML; G/100ML
INJECTION, SOLUTION INTRAVENOUS ONCE
Status: COMPLETED | OUTPATIENT
Start: 2019-01-08 | End: 2019-01-08

## 2019-01-08 RX ORDER — ONDANSETRON 4 MG/1
4 TABLET, ORALLY DISINTEGRATING ORAL 3 TIMES DAILY PRN
Qty: 6 TABLET | Refills: 0 | Status: SHIPPED | OUTPATIENT
Start: 2019-01-08 | End: 2019-10-10

## 2019-01-08 RX ADMIN — ONDANSETRON 4 MG: 2 INJECTION INTRAMUSCULAR; INTRAVENOUS at 18:03

## 2019-01-08 RX ADMIN — DEXTROSE AND SODIUM CHLORIDE: 5; 450 INJECTION, SOLUTION INTRAVENOUS at 16:40

## 2019-01-08 RX ADMIN — DEXTROSE AND SODIUM CHLORIDE: 5; 450 INJECTION, SOLUTION INTRAVENOUS at 17:45

## 2019-01-21 ENCOUNTER — HOSPITAL ENCOUNTER (OUTPATIENT)
Age: 34
Setting detail: SPECIMEN
Discharge: HOME | End: 2019-01-21
Payer: COMMERCIAL

## 2019-01-21 ENCOUNTER — ROUTINE PRENATAL (OUTPATIENT)
Dept: OBGYN | Age: 34
End: 2019-01-21

## 2019-01-21 VITALS — SYSTOLIC BLOOD PRESSURE: 114 MMHG | BODY MASS INDEX: 26.52 KG/M2 | WEIGHT: 145 LBS | DIASTOLIC BLOOD PRESSURE: 70 MMHG

## 2019-01-21 DIAGNOSIS — Z34.81 ENCOUNTER FOR SUPERVISION OF OTHER NORMAL PREGNANCY IN FIRST TRIMESTER: ICD-10-CM

## 2019-01-21 DIAGNOSIS — Z3A.12 12 WEEKS GESTATION OF PREGNANCY: ICD-10-CM

## 2019-01-21 DIAGNOSIS — Z34.81 ENCOUNTER FOR SUPERVISION OF OTHER NORMAL PREGNANCY IN FIRST TRIMESTER: Primary | ICD-10-CM

## 2019-01-21 PROCEDURE — 0502F SUBSEQUENT PRENATAL CARE: CPT | Performed by: ADVANCED PRACTICE MIDWIFE

## 2019-01-21 PROCEDURE — 87624 HPV HI-RISK TYP POOLED RSLT: CPT

## 2019-01-21 PROCEDURE — G0145 SCR C/V CYTO,THINLAYER,RESCR: HCPCS

## 2019-01-22 LAB
COMMENT: NORMAL
HPV SAMPLE: NORMAL
HPV SOURCE: NORMAL
HPV, GENOTYPE 16: NOT DETECTED
HPV, GENOTYPE 18: NOT DETECTED
HPV, HIGH RISK OTHER: NOT DETECTED
HPV, INTERPRETATION: NORMAL

## 2019-02-04 LAB — CYTOLOGY REPORT: NORMAL

## 2019-02-18 ENCOUNTER — ROUTINE PRENATAL (OUTPATIENT)
Dept: OBGYN | Age: 34
End: 2019-02-18

## 2019-02-18 VITALS — BODY MASS INDEX: 26.89 KG/M2 | WEIGHT: 147 LBS | SYSTOLIC BLOOD PRESSURE: 120 MMHG | DIASTOLIC BLOOD PRESSURE: 78 MMHG

## 2019-02-18 DIAGNOSIS — Z3A.16 16 WEEKS GESTATION OF PREGNANCY: ICD-10-CM

## 2019-02-18 DIAGNOSIS — Z34.82 ENCOUNTER FOR SUPERVISION OF OTHER NORMAL PREGNANCY IN SECOND TRIMESTER: Primary | ICD-10-CM

## 2019-02-18 PROCEDURE — 0502F SUBSEQUENT PRENATAL CARE: CPT | Performed by: ADVANCED PRACTICE MIDWIFE

## 2019-02-25 ENCOUNTER — OFFICE VISIT (OUTPATIENT)
Dept: PRIMARY CARE CLINIC | Age: 34
End: 2019-02-25
Payer: COMMERCIAL

## 2019-02-25 VITALS
BODY MASS INDEX: 26.45 KG/M2 | HEART RATE: 100 BPM | SYSTOLIC BLOOD PRESSURE: 118 MMHG | RESPIRATION RATE: 20 BRPM | TEMPERATURE: 99 F | DIASTOLIC BLOOD PRESSURE: 77 MMHG | WEIGHT: 144.6 LBS

## 2019-02-25 DIAGNOSIS — R52 GENERALIZED BODY ACHES: ICD-10-CM

## 2019-02-25 DIAGNOSIS — J01.00 ACUTE NON-RECURRENT MAXILLARY SINUSITIS: Primary | ICD-10-CM

## 2019-02-25 LAB
INFLUENZA A ANTIBODY: NEGATIVE
INFLUENZA B ANTIBODY: NEGATIVE

## 2019-02-25 PROCEDURE — 1036F TOBACCO NON-USER: CPT | Performed by: NURSE PRACTITIONER

## 2019-02-25 PROCEDURE — G8427 DOCREV CUR MEDS BY ELIG CLIN: HCPCS | Performed by: NURSE PRACTITIONER

## 2019-02-25 PROCEDURE — G8417 CALC BMI ABV UP PARAM F/U: HCPCS | Performed by: NURSE PRACTITIONER

## 2019-02-25 PROCEDURE — G8484 FLU IMMUNIZE NO ADMIN: HCPCS | Performed by: NURSE PRACTITIONER

## 2019-02-25 PROCEDURE — 87804 INFLUENZA ASSAY W/OPTIC: CPT | Performed by: NURSE PRACTITIONER

## 2019-02-25 PROCEDURE — 99213 OFFICE O/P EST LOW 20 MIN: CPT | Performed by: NURSE PRACTITIONER

## 2019-02-25 RX ORDER — CLINDAMYCIN HYDROCHLORIDE 300 MG/1
300 CAPSULE ORAL 4 TIMES DAILY
Qty: 28 CAPSULE | Refills: 0 | Status: SHIPPED | OUTPATIENT
Start: 2019-02-25 | End: 2019-03-04

## 2019-02-25 ASSESSMENT — ENCOUNTER SYMPTOMS
COUGH: 1
SINUS PAIN: 1
SORE THROAT: 1
SINUS PRESSURE: 1
EYES NEGATIVE: 1
GASTROINTESTINAL NEGATIVE: 1
RHINORRHEA: 1
ALLERGIC/IMMUNOLOGIC NEGATIVE: 1

## 2019-03-18 ENCOUNTER — ROUTINE PRENATAL (OUTPATIENT)
Dept: OBGYN | Age: 34
End: 2019-03-18
Payer: COMMERCIAL

## 2019-03-18 VITALS — DIASTOLIC BLOOD PRESSURE: 78 MMHG | WEIGHT: 146 LBS | SYSTOLIC BLOOD PRESSURE: 116 MMHG | BODY MASS INDEX: 26.7 KG/M2

## 2019-03-18 DIAGNOSIS — Z36.3 ANTENATAL SCREENING FOR MALFORMATION USING ULTRASONICS: Primary | ICD-10-CM

## 2019-03-18 DIAGNOSIS — Z3A.20 20 WEEKS GESTATION OF PREGNANCY: ICD-10-CM

## 2019-03-18 DIAGNOSIS — Z34.82 ENCOUNTER FOR SUPERVISION OF OTHER NORMAL PREGNANCY IN SECOND TRIMESTER: ICD-10-CM

## 2019-03-18 LAB
ABDOMINAL CIRCUMFERENCE: 15.7 CM
BIPARIETAL DIAMETER: 4.8 CM
ESTIMATED FETAL WEIGHT: 386 GRAMS
FEMORAL DIAMETER: NORMAL CM
HC/AC: 1.15
HEAD CIRCUMFERENCE: 18 CM

## 2019-03-18 PROCEDURE — 0502F SUBSEQUENT PRENATAL CARE: CPT | Performed by: ADVANCED PRACTICE MIDWIFE

## 2019-03-18 PROCEDURE — 76805 OB US >/= 14 WKS SNGL FETUS: CPT | Performed by: OBSTETRICS & GYNECOLOGY

## 2019-04-02 ENCOUNTER — TELEPHONE (OUTPATIENT)
Dept: OBGYN | Age: 34
End: 2019-04-02

## 2019-04-02 DIAGNOSIS — Z20.828 EXPOSURE TO INFLUENZA: Primary | ICD-10-CM

## 2019-04-02 RX ORDER — OSELTAMIVIR PHOSPHATE 75 MG/1
75 CAPSULE ORAL DAILY
Qty: 10 CAPSULE | Refills: 0 | Status: SHIPPED | OUTPATIENT
Start: 2019-04-02 | End: 2019-04-12

## 2019-04-12 ENCOUNTER — HOSPITAL ENCOUNTER (EMERGENCY)
Age: 34
Discharge: HOME | End: 2019-04-12
Attending: EMERGENCY MEDICINE
Payer: COMMERCIAL

## 2019-04-12 ENCOUNTER — APPOINTMENT (OUTPATIENT)
Dept: GENERAL RADIOLOGY | Age: 34
End: 2019-04-12
Payer: COMMERCIAL

## 2019-04-12 ENCOUNTER — TELEPHONE (OUTPATIENT)
Dept: OBGYN | Age: 34
End: 2019-04-12

## 2019-04-12 VITALS
WEIGHT: 145 LBS | HEART RATE: 85 BPM | RESPIRATION RATE: 18 BRPM | DIASTOLIC BLOOD PRESSURE: 64 MMHG | BODY MASS INDEX: 26.52 KG/M2 | SYSTOLIC BLOOD PRESSURE: 111 MMHG | OXYGEN SATURATION: 98 % | TEMPERATURE: 100.6 F

## 2019-04-12 DIAGNOSIS — E86.0 DEHYDRATION: ICD-10-CM

## 2019-04-12 DIAGNOSIS — R05.9 COUGH: Primary | ICD-10-CM

## 2019-04-12 DIAGNOSIS — B34.9 VIRAL SYNDROME: ICD-10-CM

## 2019-04-12 DIAGNOSIS — R03.0 ELEVATED BLOOD PRESSURE READING WITHOUT DIAGNOSIS OF HYPERTENSION: ICD-10-CM

## 2019-04-12 LAB
-: ABNORMAL
ABSOLUTE EOS #: 0.07 K/UL (ref 0–0.44)
ABSOLUTE IMMATURE GRANULOCYTE: 0.09 K/UL (ref 0–0.3)
ABSOLUTE LYMPH #: 1.36 K/UL (ref 1.1–3.7)
ABSOLUTE MONO #: 1.25 K/UL (ref 0.1–1.2)
ALBUMIN SERPL-MCNC: 3.3 G/DL (ref 3.5–5.2)
ALBUMIN/GLOBULIN RATIO: 0.9 (ref 1–2.5)
ALLEN TEST: ABNORMAL
ALP BLD-CCNC: 77 U/L (ref 35–104)
ALT SERPL-CCNC: 28 U/L (ref 5–33)
AMORPHOUS: ABNORMAL
AMYLASE: 33 U/L (ref 28–100)
ANION GAP SERPL CALCULATED.3IONS-SCNC: 17 MMOL/L (ref 9–17)
AST SERPL-CCNC: 24 U/L
BACTERIA: ABNORMAL
BASOPHILS # BLD: 0 % (ref 0–2)
BASOPHILS ABSOLUTE: 0.03 K/UL (ref 0–0.2)
BILIRUB SERPL-MCNC: 0.45 MG/DL (ref 0.3–1.2)
BILIRUBIN URINE: NEGATIVE
BUN BLDV-MCNC: 4 MG/DL (ref 6–20)
BUN/CREAT BLD: 9 (ref 9–20)
CALCIUM SERPL-MCNC: 8.9 MG/DL (ref 8.6–10.4)
CARBOXYHEMOGLOBIN: ABNORMAL % (ref 0–5)
CASTS UA: ABNORMAL /LPF
CHLORIDE BLD-SCNC: 100 MMOL/L (ref 98–107)
CO2: 19 MMOL/L (ref 20–31)
COLOR: YELLOW
COMMENT UA: ABNORMAL
CREAT SERPL-MCNC: 0.46 MG/DL (ref 0.5–0.9)
CRYSTALS, UA: ABNORMAL /HPF
DIFFERENTIAL TYPE: ABNORMAL
DIRECT EXAM: NORMAL
DIRECT EXAM: NORMAL
EOSINOPHILS RELATIVE PERCENT: 0 % (ref 1–4)
EPITHELIAL CELLS UA: ABNORMAL /HPF (ref 0–25)
FIO2: ABNORMAL
GFR AFRICAN AMERICAN: >60 ML/MIN
GFR NON-AFRICAN AMERICAN: >60 ML/MIN
GFR SERPL CREATININE-BSD FRML MDRD: ABNORMAL ML/MIN/{1.73_M2}
GFR SERPL CREATININE-BSD FRML MDRD: ABNORMAL ML/MIN/{1.73_M2}
GLUCOSE BLD-MCNC: 90 MG/DL (ref 70–99)
GLUCOSE URINE: NEGATIVE
HCO3 VENOUS: 16.5 MMOL/L (ref 24–30)
HCT VFR BLD CALC: 35.8 % (ref 36.3–47.1)
HEMOGLOBIN: 11.7 G/DL (ref 11.9–15.1)
IMMATURE GRANULOCYTES: 1 %
KETONES, URINE: ABNORMAL
LACTIC ACID: 0.8 MMOL/L (ref 0.5–2.2)
LEUKOCYTE ESTERASE, URINE: NEGATIVE
LIPASE: 13 U/L (ref 13–60)
LYMPHOCYTES # BLD: 9 % (ref 24–43)
Lab: NORMAL
Lab: NORMAL
MAGNESIUM: 1.8 MG/DL (ref 1.6–2.6)
MCH RBC QN AUTO: 30.6 PG (ref 25.2–33.5)
MCHC RBC AUTO-ENTMCNC: 32.7 G/DL (ref 28.4–34.8)
MCV RBC AUTO: 93.7 FL (ref 82.6–102.9)
METHEMOGLOBIN: ABNORMAL % (ref 0–1.9)
MODE: ABNORMAL
MONOCYTES # BLD: 8 % (ref 3–12)
MUCUS: ABNORMAL
NEGATIVE BASE EXCESS, VEN: 6 MMOL/L (ref 0–2)
NITRITE, URINE: NEGATIVE
NOTIFICATION TIME: ABNORMAL
NOTIFICATION: ABNORMAL
NRBC AUTOMATED: 0 PER 100 WBC
O2 DEVICE/FLOW/%: ABNORMAL
O2 SAT, VEN: 89 % (ref 60–85)
OTHER OBSERVATIONS UA: ABNORMAL
OXYHEMOGLOBIN: ABNORMAL % (ref 95–98)
PARTIAL THROMBOPLASTIN TIME: 28.7 SEC (ref 23.2–34.4)
PATIENT TEMP: ABNORMAL
PCO2, VEN, TEMP ADJ: ABNORMAL MMHG (ref 39–55)
PCO2, VEN: 25.7 (ref 39–55)
PDW BLD-RTO: 12.5 % (ref 11.8–14.4)
PEEP/CPAP: ABNORMAL
PH UA: 6 (ref 5–9)
PH VENOUS: 7.42 (ref 7.32–7.42)
PH, VEN, TEMP ADJ: ABNORMAL (ref 7.32–7.42)
PLATELET # BLD: 195 K/UL (ref 138–453)
PLATELET ESTIMATE: ABNORMAL
PMV BLD AUTO: 11.7 FL (ref 8.1–13.5)
PO2, VEN, TEMP ADJ: ABNORMAL MMHG (ref 30–50)
PO2, VEN: 53.2 (ref 30–50)
POSITIVE BASE EXCESS, VEN: ABNORMAL MMOL/L (ref 0–2)
POTASSIUM SERPL-SCNC: 3.5 MMOL/L (ref 3.7–5.3)
PROTEIN UA: NEGATIVE
PSV: ABNORMAL
PT. POSITION: ABNORMAL
RBC # BLD: 3.82 M/UL (ref 3.95–5.11)
RBC # BLD: ABNORMAL 10*6/UL
RBC UA: ABNORMAL /HPF (ref 0–2)
RENAL EPITHELIAL, UA: ABNORMAL /HPF
RESPIRATORY RATE: ABNORMAL
SAMPLE SITE: ABNORMAL
SEG NEUTROPHILS: 82 % (ref 36–65)
SEGMENTED NEUTROPHILS ABSOLUTE COUNT: 12.82 K/UL (ref 1.5–8.1)
SET RATE: ABNORMAL
SODIUM BLD-SCNC: 136 MMOL/L (ref 135–144)
SPECIFIC GRAVITY UA: 1.02 (ref 1.01–1.02)
SPECIMEN DESCRIPTION: NORMAL
SPECIMEN DESCRIPTION: NORMAL
TEXT FOR RESPIRATORY: ABNORMAL
TOTAL HB: ABNORMAL G/DL (ref 12–16)
TOTAL PROTEIN: 6.8 G/DL (ref 6.4–8.3)
TOTAL RATE: ABNORMAL
TRICHOMONAS: ABNORMAL
TURBIDITY: CLEAR
URINE HGB: NEGATIVE
UROBILINOGEN, URINE: NORMAL
VT: ABNORMAL
WBC # BLD: 15.6 K/UL (ref 3.5–11.3)
WBC # BLD: ABNORMAL 10*3/UL
WBC UA: ABNORMAL /HPF (ref 0–5)
YEAST: ABNORMAL

## 2019-04-12 PROCEDURE — 2580000003 HC RX 258: Performed by: EMERGENCY MEDICINE

## 2019-04-12 PROCEDURE — 85730 THROMBOPLASTIN TIME PARTIAL: CPT

## 2019-04-12 PROCEDURE — 36415 COLL VENOUS BLD VENIPUNCTURE: CPT

## 2019-04-12 PROCEDURE — 82805 BLOOD GASES W/O2 SATURATION: CPT

## 2019-04-12 PROCEDURE — 6370000000 HC RX 637 (ALT 250 FOR IP): Performed by: EMERGENCY MEDICINE

## 2019-04-12 PROCEDURE — 80053 COMPREHEN METABOLIC PANEL: CPT

## 2019-04-12 PROCEDURE — 87040 BLOOD CULTURE FOR BACTERIA: CPT

## 2019-04-12 PROCEDURE — 83605 ASSAY OF LACTIC ACID: CPT

## 2019-04-12 PROCEDURE — 96360 HYDRATION IV INFUSION INIT: CPT

## 2019-04-12 PROCEDURE — 81001 URINALYSIS AUTO W/SCOPE: CPT

## 2019-04-12 PROCEDURE — 83690 ASSAY OF LIPASE: CPT

## 2019-04-12 PROCEDURE — 83735 ASSAY OF MAGNESIUM: CPT

## 2019-04-12 PROCEDURE — 99283 EMERGENCY DEPT VISIT LOW MDM: CPT

## 2019-04-12 PROCEDURE — 71046 X-RAY EXAM CHEST 2 VIEWS: CPT

## 2019-04-12 PROCEDURE — 87804 INFLUENZA ASSAY W/OPTIC: CPT

## 2019-04-12 PROCEDURE — 82150 ASSAY OF AMYLASE: CPT

## 2019-04-12 PROCEDURE — 85025 COMPLETE CBC W/AUTO DIFF WBC: CPT

## 2019-04-12 PROCEDURE — 87880 STREP A ASSAY W/OPTIC: CPT

## 2019-04-12 RX ORDER — SODIUM CHLORIDE 9 MG/ML
INJECTION, SOLUTION INTRAVENOUS CONTINUOUS
Status: DISCONTINUED | OUTPATIENT
Start: 2019-04-12 | End: 2019-04-13 | Stop reason: HOSPADM

## 2019-04-12 RX ORDER — ACETAMINOPHEN 500 MG
1000 TABLET ORAL EVERY 6 HOURS PRN
Qty: 30 TABLET | Refills: 0 | Status: SHIPPED | OUTPATIENT
Start: 2019-04-12 | End: 2021-11-17

## 2019-04-12 RX ORDER — ACETAMINOPHEN 500 MG
1000 TABLET ORAL ONCE
Status: COMPLETED | OUTPATIENT
Start: 2019-04-12 | End: 2019-04-12

## 2019-04-12 RX ORDER — 0.9 % SODIUM CHLORIDE 0.9 %
30 INTRAVENOUS SOLUTION INTRAVENOUS ONCE
Status: DISCONTINUED | OUTPATIENT
Start: 2019-04-12 | End: 2019-04-12

## 2019-04-12 RX ORDER — DEXTROMETHORPHAN HYDROBROMIDE AND PROMETHAZINE HYDROCHLORIDE 15; 6.25 MG/5ML; MG/5ML
5 SYRUP ORAL 4 TIMES DAILY PRN
Qty: 90 ML | Refills: 0 | Status: SHIPPED | OUTPATIENT
Start: 2019-04-12 | End: 2019-04-19

## 2019-04-12 RX ADMIN — SODIUM CHLORIDE: 9 INJECTION, SOLUTION INTRAVENOUS at 22:07

## 2019-04-12 RX ADMIN — SODIUM CHLORIDE: 9 INJECTION, SOLUTION INTRAVENOUS at 21:57

## 2019-04-12 RX ADMIN — SODIUM CHLORIDE: 9 INJECTION, SOLUTION INTRAVENOUS at 21:29

## 2019-04-12 RX ADMIN — ACETAMINOPHEN 1000 MG: 500 TABLET, FILM COATED ORAL at 20:23

## 2019-04-12 ASSESSMENT — ENCOUNTER SYMPTOMS
COUGH: 1
SINUS PAIN: 0
EYE REDNESS: 0
SINUS PRESSURE: 0
COLOR CHANGE: 0
EYE DISCHARGE: 0
EYE ITCHING: 0
RECTAL PAIN: 0
CHEST TIGHTNESS: 0
VOMITING: 0
DIARRHEA: 0
VOICE CHANGE: 0
STRIDOR: 0
ABDOMINAL PAIN: 0
SORE THROAT: 0
CONSTIPATION: 0
BLOOD IN STOOL: 0
PHOTOPHOBIA: 0
NAUSEA: 0
BACK PAIN: 0
WHEEZING: 0
SHORTNESS OF BREATH: 0
FACIAL SWELLING: 0
EYE PAIN: 0
ABDOMINAL DISTENTION: 0
RHINORRHEA: 1
TROUBLE SWALLOWING: 0

## 2019-04-12 ASSESSMENT — PAIN SCALES - GENERAL
PAINLEVEL_OUTOF10: 7
PAINLEVEL_OUTOF10: 9

## 2019-04-12 ASSESSMENT — PAIN DESCRIPTION - PAIN TYPE: TYPE: ACUTE PAIN

## 2019-04-12 ASSESSMENT — PAIN DESCRIPTION - LOCATION: LOCATION: GENERALIZED

## 2019-04-12 ASSESSMENT — PAIN DESCRIPTION - DESCRIPTORS: DESCRIPTORS: ACHING

## 2019-04-12 NOTE — ED PROVIDER NOTES
[facility] ED  eMERGENCY dEPARTMENT eNCOUnter      Pt Name: Sami Bustos  MRN: [id number]  [place] 1985  Date of evaluation: 2019  Provider: Delia Slade MD    CHIEF COMPLAINT     Chief Complaint   Patient presents with    Influenza     exposued to influenza a and was on tamaflu for 10 days but off and now has increased in symptoms    Fever         HISTORY OF PRESENT ILLNESS   (Location/Symptom, Timing/Onset, Context/Setting,Quality, Duration, Modifying Factors, Severity)  Note limiting factors. Sami Bustos is a33 y.o. female who presents to the emergency department with fever     HPI  Patient is a  @ 24 weeks pregnant patient who was exposed to influenza 12 days ago and started on Tamiflu once daily for 10 day course prophylaxis by her OB/GYN. Patient over the last 2 days had increasing cough, congestion, fever, fatigue, myalgias and pain in her neck. Patient denies any rashes, productive cough, shortness of breath, abdominal pain, dysuria, urinary frequency, vaginal bleeding, leakage of fluids, overwhelming headache, change in mental status, or any skin rashes. Patient has not been evaluated seen by her OB/GYN or anyone else in the past 2 days. Patient's tried Tylenol in the morning of 2019 which did help her symptoms. Patient denies any new nausea or vomiting. Nursing Notes were reviewed. REVIEW OF SYSTEMS    (2-9 systems for level 4, 10 or more for level 5)     Review of Systems   Constitutional: Positive for fatigue and fever. Negative for activity change, chills, diaphoresis and unexpected weight change. HENT: Positive for rhinorrhea. Negative for congestion, dental problem, drooling, ear discharge, ear pain, facial swelling, mouth sores, postnasal drip, sinus pressure, sinus pain, sore throat, trouble swallowing and voice change. Eyes: Negative for photophobia, pain, discharge, redness, itching and visual disturbance.    Respiratory: Positive for cough. Negative for chest tightness, shortness of breath, wheezing and stridor. Cardiovascular: Negative for chest pain, palpitations and leg swelling. Gastrointestinal: Negative for abdominal distention, abdominal pain, blood in stool, constipation, diarrhea, nausea, rectal pain and vomiting. Endocrine: Negative for cold intolerance, polydipsia and polyuria. Genitourinary: Negative for decreased urine volume, difficulty urinating, dysuria, flank pain, frequency, genital sores, hematuria and urgency. Musculoskeletal: Negative for arthralgias, back pain, myalgias, neck pain and neck stiffness. Skin: Negative for color change, pallor, rash and wound. Allergic/Immunologic: Negative for environmental allergies, food allergies and immunocompromised state. Neurological: Negative for dizziness, tremors, seizures, speech difficulty, weakness, light-headedness, numbness and headaches. Hematological: Negative for adenopathy. Does not bruise/bleed easily. Psychiatric/Behavioral: Negative for agitation, behavioral problems, confusion, dysphoric mood, hallucinations, sleep disturbance and suicidal ideas. The patient is not nervous/anxious. Except as noted above the remainder of the review of systems was reviewed and negative.        PAST MEDICAL HISTORY     Past Medical History:   Diagnosis Date    Abnormal Pap smear of cervix     Repeat pap yearly    Migraine     Recurrent UTI          SURGICALHISTORY       Past Surgical History:   Procedure Laterality Date    CHOLECYSTECTOMY      DILATION AND CURETTAGE  09/21/2018    KNEE SURGERY Left     KNEE SURGERY Left     TONSILLECTOMY           CURRENT MEDICATIONS       Previous Medications    ONDANSETRON (ZOFRAN-ODT) 4 MG DISINTEGRATING TABLET    Take 1 tablet by mouth 3 times daily as needed for Nausea or Vomiting    OSELTAMIVIR (TAMIFLU) 75 MG CAPSULE    Take 1 capsule by mouth daily for 10 days    PRENATAL MV-MIN-FE FUM-FA-DHA (PRENATAL 1 PO)    Take by mouth       ALLERGIES     Pcn [penicillins];  Percocet [oxycodone-acetaminophen]; and Vicodin [hydrocodone-acetaminophen]    FAMILY HISTORY       Family History   Problem Relation Age of Onset    Hypertension Mother     Hypertension Father     Asthma Brother     No Known Problems Maternal Grandmother     Heart Disease Maternal Grandfather     Other Paternal Grandmother         High Cholesterol    Other Paternal Grandfather         High Cholesterol    Other Other         No family h/o breast cancer or stroke          SOCIAL HISTORY       Social History     Socioeconomic History    Marital status:      Spouse name: None    Number of children: None    Years of education: None    Highest education level: None   Occupational History    None   Social Needs    Financial resource strain: None    Food insecurity:     Worry: None     Inability: None    Transportation needs:     Medical: None     Non-medical: None   Tobacco Use    Smoking status: Former Smoker     Packs/day: 0.25    Smokeless tobacco: Never Used    Tobacco comment: 1 year ago   Substance and Sexual Activity    Alcohol use: No     Alcohol/week: 0.0 oz     Comment: occationally    Drug use: No    Sexual activity: Not Currently     Partners: Male   Lifestyle    Physical activity:     Days per week: None     Minutes per session: None    Stress: None   Relationships    Social connections:     Talks on phone: None     Gets together: None     Attends Cheondoism service: None     Active member of club or organization: None     Attends meetings of clubs or organizations: None     Relationship status: None    Intimate partner violence:     Fear of current or ex partner: None     Emotionally abused: None     Physically abused: None     Forced sexual activity: None   Other Topics Concern    None   Social History Narrative    None       SCREENINGS    [place] Coma Scale  Eye Opening: Spontaneous  Best Verbal Response: Oriented  Best Motor Response: Obeys commands  Halima Coma Scale Score: 15        PHYSICAL EXAM    (up to 7 for level 4, 8 or more for level 5)     Vitals:    04/12/19 2136 04/12/19 2141 04/12/19 2200 04/12/19 2221   BP: 117/70 106/71  111/64   Pulse:   80    Resp:       Temp:       TempSrc:       SpO2: 97% 97%  98%   Weight:             Physical Exam   Constitutional: She is oriented to person, place, and time. She appears well-developed and well-nourished. HENT:   Head: Normocephalic and atraumatic. Right Ear: Hearing, tympanic membrane, external ear and ear canal normal.   Left Ear: Hearing, tympanic membrane, external ear and ear canal normal.   Nose: Mucosal edema and rhinorrhea present. Mouth/Throat: Oropharynx is clear and moist.   Eyes: Pupils are equal, round, and reactive to light. Conjunctivae and EOM are normal.   Neck: Normal range of motion. Neck supple. Cardiovascular: Normal rate, regular rhythm, normal heart sounds and intact distal pulses. Pulmonary/Chest: Effort normal and breath sounds normal. No stridor. She has no wheezes. She has no rales. Abdominal: Soft. Bowel sounds are normal. There is no tenderness. There is no rebound and no guarding. Musculoskeletal: Normal range of motion. She exhibits no edema, tenderness or deformity. Lymphadenopathy:     She has no cervical adenopathy. Neurological: She is alert and oriented to person, place, and time. She has normal strength. No cranial nerve deficit or sensory deficit. GCS eye subscore is 4. GCS verbal subscore is 5. GCS motor subscore is 6. Skin: Skin is warm and dry. Capillary refill takes less than 2 seconds. No rash noted. No pallor. Psychiatric: She has a normal mood and affect. Her behavior is normal.   Nursing note and vitals reviewed.       DIAGNOSTIC RESULTS     RADIOLOGY:   Non-plain film images such as CT, Ultrasound and MRI are read by the radiologist. Plain radiographic images are visualized and preliminarily interpreted by the emergency physician with the below findings:        Interpretation per the Radiologist below, ifavailable at the time of this note:    XR CHEST STANDARD (2 VW)   Final Result   No acute cardiopulmonary disease. ED BEDSIDE ULTRASOUND:   Performed by ED Physician - none    LABS:  Labs Reviewed   CBC WITH AUTO DIFFERENTIAL - Abnormal; Notable for the following components:       Result Value    WBC 15.6 (*)     RBC 3.82 (*)     Hemoglobin 11.7 (*)     Hematocrit 35.8 (*)     Seg Neutrophils 82 (*)     Lymphocytes 9 (*)     Eosinophils % 0 (*)     Immature Granulocytes 1 (*)     Segs Absolute 12.82 (*)     Absolute Mono # 1.25 (*)     All other components within normal limits   COMPREHENSIVE METABOLIC PANEL W/ REFLEX TO MG FOR LOW K - Abnormal; Notable for the following components:    BUN 4 (*)     CREATININE 0.46 (*)     Potassium 3.5 (*)     CO2 19 (*)     Alb 3.3 (*)     Albumin/Globulin Ratio 0.9 (*)     All other components within normal limits   URINALYSIS - Abnormal; Notable for the following components:    Ketones, Urine 3+ (*)     All other components within normal limits   MICROSCOPIC URINALYSIS - Abnormal; Notable for the following components:    Bacteria, UA 2+ (*)     Mucus, UA 1+ (*)     All other components within normal limits   BLOOD GAS, VENOUS - Abnormal; Notable for the following components:    pH, Bald[place]ar 7.425 (*)     pCO2, [place] 25.7 (*)     pO2, [place] 53.2 (*)     HCO3, Venous 16.5 (*)     Negative Base Excess, Bald[place]ar 6.0 (*)     O2 Sat, Bald[place]ar 89.0 (*)     All other components within normal limits   RAPID INFLUENZA A/B ANTIGENS   STREP SCREEN GROUP A THROAT   CULTURE BLOOD #1   CULTURE BLOOD #2   LACTIC ACID   AMYLASE   LIPASE   APTT   MAGNESIUM       All other labs were within normal range ornot returned as of this dictation.     EMERGENCY DEPARTMENT COURSE and DIFFERENTIAL DIAGNOSIS/MDM:   Vitals:    Vitals:    04/12/19 2136 04/12/19 2141 04/12/19 2200 04/12/19 2221   BP: 117/70 106/71  111/64   Pulse:   80    Resp:       Temp:       TempSrc:       SpO2: 97% 97%  98%   Weight:         Re-Evaluation: 22:55  Patient is feeling better. Patient continues to have a supple neck with no meningeal signs or any respiratory distress. Patient has no suspicious rashes. Re-Evaluation: 23:32  Patient continues to feel well and would like to be discharged and continue treatment at home. Patient's tachycardia and elevated blood pressure improved significantly after IV hydration. MDM  Patient came in complaining of a 2 day duration of upper respiratory infection symptoms causing cough and myalgias after being treated prophylactically for 10 days with Tamiflu after being exposed to influenza approximately 12 days ago. Patient had initial evaluation of fetal heart tones in the emergency department with her rate in the 150s. Patient was initially hypertensive, tachycardic, and febrile here in the emergency room with a T-max of 100.6, but after IV hydration, lab evaluation, chest x-ray, rapid strep and rapid influenza with a urinalysis performed, patient has significant improvement in all vitals and was hemodynamically In good condition throughout her time in the emergency department. Elevated blood pressure resolved and tachycardia resolved. Patient no source of specific infection as the rapid strep, rapid flu, chest x-ray, and urinalysis were negative. Patient was discussed with her OB/GYN midwife who was not concerned with the elevated white blood cell count as this is a normal physiologic changes of pregnancy at this stage where she's had. Patient's other lab results all within normal limits that she had in normal venous blood gas, normal lactic acid, normal renal and hepatic functions.   Patient could be discharged home per their perspective as we determined the patient most likely is not preeclamptic and that her symptoms are most likely from a viral illness causing dehydration with clamping of the peripheral arterials increasing her blood pressure which initially came in. My opinion also was that patient did not have meningitis as she had a supple neck and no signs of meningeal irritation on her physical examinations, so I felt there would be no benefit from a lumbar puncture and the information that able to give. I reviewed with patient and her significant other in detail what signs and symptoms to return back to the emergency department. CRITICAL CARE TIME   None    CONSULTS:  Discussed the case with Lety Dalton, Nurse Mid-wife of the patient at 23:30. Larissa Barboza states patient can be discharged home and follow-up with her in the office on 4/15/2019. PROCEDURES:  Unless otherwise noted below, none     Procedures    FINAL IMPRESSION      1. Cough    2. Viral syndrome    3. Dehydration    4.  Elevated blood pressure reading without diagnosis of hypertension          DISPOSITION/PLAN   DISPOSITION    Discharge Home in Good Condition      PATIENT REFERRED TO:  Demetrice Mcbride MD  [facility]  [phone]    In 1 day  As needed    Clois Shai, [address] [place]  [address] [place] [address] [phone]    In 3 days  as scheduled    [facility]  [address]  [phone]    As needed, If symptoms worsen      DISCHARGE MEDICATIONS:  New Prescriptions    ACETAMINOPHEN (TYLENOL) 500 MG TABLET    Take 2 tablets by mouth every 6 hours as needed for Pain or Fever Patient is 24 weeks pregnant    PROMETHAZINE-DEXTROMETHORPHAN (PROMETHAZINE-DM) 6.25-15 MG/5ML SYRUP    Take 5 mLs by mouth 4 times daily as needed for Cough Patient is 24 weeks pregnant              (Please note that portions of this note were completed with a voice recognition program.  Efforts were made to edit the dictations but occasionally words are mis-transcribed.)      Avila Tompkins MD (electronically signed)  Attending Emergency Physician          Latonya Vogel MD  04/12/19 0448

## 2019-04-13 NOTE — TELEPHONE ENCOUNTER
Call received at 3:30 pm.     Patient is at 34 weeks, had flu exposure and took tamiflu. Now has temp of 101, sore throat, and a hard cough. Patient will go to Urgent Care to be assessed.

## 2019-04-15 ENCOUNTER — ROUTINE PRENATAL (OUTPATIENT)
Dept: OBGYN | Age: 34
End: 2019-04-15

## 2019-04-15 VITALS — WEIGHT: 152.8 LBS | SYSTOLIC BLOOD PRESSURE: 122 MMHG | BODY MASS INDEX: 27.95 KG/M2 | DIASTOLIC BLOOD PRESSURE: 78 MMHG

## 2019-04-15 DIAGNOSIS — Z34.82 ENCOUNTER FOR SUPERVISION OF OTHER NORMAL PREGNANCY IN SECOND TRIMESTER: Primary | ICD-10-CM

## 2019-04-15 DIAGNOSIS — J00 ACUTE NASOPHARYNGITIS: ICD-10-CM

## 2019-04-15 DIAGNOSIS — Z3A.24 24 WEEKS GESTATION OF PREGNANCY: ICD-10-CM

## 2019-04-15 PROCEDURE — 0502F SUBSEQUENT PRENATAL CARE: CPT | Performed by: ADVANCED PRACTICE MIDWIFE

## 2019-04-15 RX ORDER — FLUTICASONE PROPIONATE 50 MCG
2 SPRAY, SUSPENSION (ML) NASAL DAILY
Qty: 1 BOTTLE | Refills: 0 | Status: SHIPPED | OUTPATIENT
Start: 2019-04-15 | End: 2021-03-17

## 2019-04-15 RX ORDER — AZITHROMYCIN 250 MG/1
TABLET, FILM COATED ORAL
Qty: 1 PACKET | Refills: 0 | Status: SHIPPED | OUTPATIENT
Start: 2019-04-15 | End: 2019-07-09 | Stop reason: ALTCHOICE

## 2019-04-15 RX ORDER — LORATADINE 10 MG/1
10 TABLET ORAL DAILY PRN
Qty: 30 TABLET | Refills: 0 | Status: SHIPPED | OUTPATIENT
Start: 2019-04-15 | End: 2021-11-17

## 2019-04-15 NOTE — PROGRESS NOTES
Gema Morgan is here at 24w1d for:    Chief Complaint   Patient presents with   Deuce Magana Routine Prenatal Visit     Instructions given for 1 hour GTT. next visit. Nasal congestion and cough, was at [facility] severaldays ago. Estimated Due Date: Estimated Date of Delivery: 19    OB History    Para Term  AB Living   3 1 1   1 1   SAB TAB Ectopic Molar Multiple Live Births             1      # Outcome Date GA Lbr Doug/2nd Weight Sex Delivery Anes PTL Lv   3 Current            2 AB 18 10w4d          1 Term 08/21/10 40w0d  7 lb 10 oz (3.459 kg) F Vag-Spont None N LOUISA        Past Medical History:   Diagnosis Date    Abnormal Pap smear of cervix     Repeat pap yearly    Migraine     Recurrent UTI        Past Surgical History:   Procedure Laterality Date    CHOLECYSTECTOMY      DILATION AND CURETTAGE  2018    KNEE SURGERY Left     KNEE SURGERY Left     TONSILLECTOMY         Social History     Tobacco Use   Smoking Status Former Smoker    Packs/day: 0.25   Smokeless Tobacco Never Used   Tobacco Comment    1 year ago        Social History     Substance and Sexual Activity   Alcohol Use No    Alcohol/week: 0.0 oz    Comment: occationally       No results found for this visit on 04/15/19. Vitals:  /78   Wt 152 lb 12.8 oz (69.3 kg)   LMP 10/24/2018 (Approximate)   BMI 27.95 kg/m²   Estimated body mass index is 27.95 kg/m² as calculated from the following:    Height as of 10/19/18: 5' 2\" (1.575 m). Weight as of this encounter: 152 lb 12.8 oz (69.3 kg). HPI: here for routine ob visit, has had ongoing URI     PT had fever and chills none now, denies nausea and vomiting  , continues with nasal congestion and PND     Abdomen: enlarged, gravid, soft, nontender     Results reviewed today:    No results found for this visit on 04/15/19. See prenatal vital sign section and fetal assessment section    ASSESSMENT & Plan    Diagnosis Orders   1.  Encounter for supervision

## 2019-04-17 LAB
CULTURE: NORMAL
CULTURE: NORMAL
Lab: NORMAL
Lab: NORMAL
SPECIMEN DESCRIPTION: NORMAL
SPECIMEN DESCRIPTION: NORMAL

## 2019-05-13 ENCOUNTER — ROUTINE PRENATAL (OUTPATIENT)
Dept: OBGYN | Age: 34
End: 2019-05-13

## 2019-05-13 VITALS — WEIGHT: 156 LBS | SYSTOLIC BLOOD PRESSURE: 122 MMHG | DIASTOLIC BLOOD PRESSURE: 74 MMHG | BODY MASS INDEX: 28.53 KG/M2

## 2019-05-13 DIAGNOSIS — Z34.83 ENCOUNTER FOR SUPERVISION OF OTHER NORMAL PREGNANCY IN THIRD TRIMESTER: Primary | ICD-10-CM

## 2019-05-13 DIAGNOSIS — Z3A.28 28 WEEKS GESTATION OF PREGNANCY: ICD-10-CM

## 2019-05-13 LAB
CHP ED QC CHECK: NORMAL
GLUCOSE BLD-MCNC: 116 MG/DL
HGB, POC: 11.9

## 2019-05-13 PROCEDURE — 0502F SUBSEQUENT PRENATAL CARE: CPT | Performed by: ADVANCED PRACTICE MIDWIFE

## 2019-05-13 NOTE — PROGRESS NOTES
Sherwin Chery is here at 28w1d for:    Chief Complaint   Patient presents with    Routine Prenatal Visit     1 hour gtt. today. Estimated Due Date: Estimated Date of Delivery: 19    OB History    Para Term  AB Living   3 1 1   1 1   SAB TAB Ectopic Molar Multiple Live Births             1      # Outcome Date GA Lbr Doug/2nd Weight Sex Delivery Anes PTL Lv   3 Current            2 AB 18 10w4d          1 Term 08/21/10 40w0d  7 lb 10 oz (3.459 kg) F Vag-Spont None N LOUISA        Past Medical History:   Diagnosis Date    Abnormal Pap smear of cervix     Repeat pap yearly    Migraine     Recurrent UTI        Past Surgical History:   Procedure Laterality Date    CHOLECYSTECTOMY      DILATION AND CURETTAGE  2018    KNEE SURGERY Left     KNEE SURGERY Left     TONSILLECTOMY         Social History     Tobacco Use   Smoking Status Former Smoker    Packs/day: 0.25   Smokeless Tobacco Never Used   Tobacco Comment    1 year ago        Social History     Substance and Sexual Activity   Alcohol Use No    Alcohol/week: 0.0 oz    Comment: occationally       No results found for this visit on 19. Vitals:  /74   Wt 156 lb (70.8 kg)   LMP 10/24/2018 (Approximate)   BMI 28.53 kg/m²   Estimated body mass index is 28.53 kg/m² as calculated from the following:    Height as of 10/19/18: 5' 2\" (1.575 m). Weight as of this encounter: 156 lb (70.8 kg). HPI: here for routine ob visit and gtt, denies c/o     PT denies fever, chills, nausea and vomiting       Abdomen: enlarged, gravid, soft, nontender     Results reviewed today:    No results found for this visit on 19. See prenatal vital sign section and fetal assessment section    ASSESSMENT & Plan    Diagnosis Orders   1.  Encounter for supervision of other normal pregnancy in third trimester     2. 28 weeks gestation of pregnancy               I am having Sherwin Chery maintain her Prenatal MV-Min-Fe

## 2019-05-30 ENCOUNTER — ROUTINE PRENATAL (OUTPATIENT)
Dept: OBGYN | Age: 34
End: 2019-05-30
Payer: COMMERCIAL

## 2019-05-30 VITALS — DIASTOLIC BLOOD PRESSURE: 84 MMHG | SYSTOLIC BLOOD PRESSURE: 132 MMHG | WEIGHT: 157 LBS | BODY MASS INDEX: 28.72 KG/M2

## 2019-05-30 DIAGNOSIS — Z3A.30 30 WEEKS GESTATION OF PREGNANCY: ICD-10-CM

## 2019-05-30 DIAGNOSIS — Z23 NEED FOR TDAP VACCINATION: ICD-10-CM

## 2019-05-30 DIAGNOSIS — O26.843 UTERINE SIZE-DATE DISCREPANCY IN THIRD TRIMESTER: Primary | ICD-10-CM

## 2019-05-30 LAB
ABDOMINAL CIRCUMFERENCE: 27.7 CM
BIPARIETAL DIAMETER: 8.2 CM
ESTIMATED FETAL WEIGHT: 1877 GRAMS
FEMORAL DIAMETER: NORMAL CM
HC/AC: 1.08
HEAD CIRCUMFERENCE: 30 CM

## 2019-05-30 PROCEDURE — 76816 OB US FOLLOW-UP PER FETUS: CPT | Performed by: OBSTETRICS & GYNECOLOGY

## 2019-05-30 PROCEDURE — 90471 IMMUNIZATION ADMIN: CPT | Performed by: ADVANCED PRACTICE MIDWIFE

## 2019-05-30 PROCEDURE — 0502F SUBSEQUENT PRENATAL CARE: CPT | Performed by: ADVANCED PRACTICE MIDWIFE

## 2019-05-30 PROCEDURE — 90715 TDAP VACCINE 7 YRS/> IM: CPT | Performed by: ADVANCED PRACTICE MIDWIFE

## 2019-06-01 NOTE — PROGRESS NOTES
Dylon Garibay is here at 30w6d for:    Chief Complaint   Patient presents with    Routine Prenatal Visit     Follow up in house ultrasound. Tdap given today. C/O right lower arm and wrist tingling and numbness. Estimated Due Date: Estimated Date of Delivery: 19    OB History    Para Term  AB Living   3 1 1   1 1   SAB TAB Ectopic Molar Multiple Live Births             1      # Outcome Date GA Lbr Doug/2nd Weight Sex Delivery Anes PTL Lv   3 Current            2 AB 18 10w4d          1 Term 08/21/10 40w0d  7 lb 10 oz (3.459 kg) F Vag-Spont None N LOUISA        Past Medical History:   Diagnosis Date    Abnormal Pap smear of cervix     Repeat pap yearly    Migraine     Recurrent UTI        Past Surgical History:   Procedure Laterality Date    CHOLECYSTECTOMY      DILATION AND CURETTAGE  2018    KNEE SURGERY Left     KNEE SURGERY Left     TONSILLECTOMY         Social History     Tobacco Use   Smoking Status Former Smoker    Packs/day: 0.25   Smokeless Tobacco Never Used   Tobacco Comment    1 year ago        Social History     Substance and Sexual Activity   Alcohol Use No    Alcohol/week: 0.0 oz    Comment: occationally       Results for orders placed or performed in visit on 19   US OB Follow Up Transabdominal Approach   Result Value Ref Range    Biparietal Diameter 8.2 cm    Abdominal Circumference 27.7 cm    Femoral Diameter  cm    Head Circumference 30 cm    HC/AC 1.08     Estimated Fetal Weight 1,877 grams       Vitals:  /84   Wt 157 lb (71.2 kg)   LMP 10/24/2018 (Approximate)   BMI 28.72 kg/m²   Estimated body mass index is 28.72 kg/m² as calculated from the following:    Height as of 10/19/18: 5' 2\" (1.575 m). Weight as of this encounter: 157 lb (71.2 kg).       HPI: here for routine ob visit, denies c/o; normal growth US     PT denies fever, chills, nausea and vomiting       Abdomen: enlarged, Gravid, soft, nontender  Results reviewed today:    Results for orders placed or performed in visit on 05/30/19   US OB Follow Up Transabdominal Approach   Result Value Ref Range    Biparietal Diameter 8.2 cm    Abdominal Circumference 27.7 cm    Femoral Diameter  cm    Head Circumference 30 cm    HC/AC 1.08     Estimated Fetal Weight 1,877 grams       See prenatal vital sign section and fetal assessment section    ASSESSMENT & Plan    Diagnosis Orders   1. Uterine size-date discrepancy in third trimester  US OB Follow Up Transabdominal Approach   2. 30 weeks gestation of pregnancy  US OB Follow Up Transabdominal Approach    Tdap (ADACEL) injection 0.5 mL   3. Need for Tdap vaccination  Tdap (ADACEL) injection 0.5 mL             I am having Lubna Metcalf maintain her Prenatal MV-Min-Fe Fum-FA-DHA (PRENATAL 1 PO), ondansetron, acetaminophen, fluticasone, azithromycin, and loratadine. We administered Tdap. Return in about 2 weeks (around 6/13/2019) for ob. There are no Patient Instructions on file for this visit.           Sherif Diaz,6/1/2019 4:03 PM

## 2019-06-10 ENCOUNTER — ROUTINE PRENATAL (OUTPATIENT)
Dept: OBGYN | Age: 34
End: 2019-06-10

## 2019-06-10 ENCOUNTER — HOSPITAL ENCOUNTER (OUTPATIENT)
Age: 34
Setting detail: SPECIMEN
Discharge: HOME | End: 2019-06-10
Payer: COMMERCIAL

## 2019-06-10 VITALS — BODY MASS INDEX: 28.9 KG/M2 | WEIGHT: 158 LBS | SYSTOLIC BLOOD PRESSURE: 130 MMHG | DIASTOLIC BLOOD PRESSURE: 82 MMHG

## 2019-06-10 DIAGNOSIS — Z3A.32 32 WEEKS GESTATION OF PREGNANCY: ICD-10-CM

## 2019-06-10 DIAGNOSIS — N89.8 VAGINAL DISCHARGE: ICD-10-CM

## 2019-06-10 DIAGNOSIS — Z34.83 ENCOUNTER FOR SUPERVISION OF OTHER NORMAL PREGNANCY IN THIRD TRIMESTER: Primary | ICD-10-CM

## 2019-06-10 PROCEDURE — 0502F SUBSEQUENT PRENATAL CARE: CPT | Performed by: ADVANCED PRACTICE MIDWIFE

## 2019-06-10 PROCEDURE — 87070 CULTURE OTHR SPECIMN AEROBIC: CPT

## 2019-06-10 NOTE — PROGRESS NOTES
Dianna Jennifer is here at 32w1d for:    Chief Complaint   Patient presents with    Routine Prenatal Visit     One episode of increased vaginal discharge, more fluid? Estimated Due Date: Estimated Date of Delivery: 19    OB History    Para Term  AB Living   3 1 1   1 1   SAB TAB Ectopic Molar Multiple Live Births             1      # Outcome Date GA Lbr Doug/2nd Weight Sex Delivery Anes PTL Lv   3 Current            2 AB 18 10w4d          1 Term 08/21/10 40w0d  7 lb 10 oz (3.459 kg) F Vag-Spont None N LOUISA        Past Medical History:   Diagnosis Date    Abnormal Pap smear of cervix     Repeat pap yearly    Migraine     Recurrent UTI        Past Surgical History:   Procedure Laterality Date    CHOLECYSTECTOMY      DILATION AND CURETTAGE  2018    KNEE SURGERY Left     KNEE SURGERY Left     TONSILLECTOMY         Social History     Tobacco Use   Smoking Status Former Smoker    Packs/day: 0.25   Smokeless Tobacco Never Used   Tobacco Comment    1 year ago        Social History     Substance and Sexual Activity   Alcohol Use No    Alcohol/week: 0.0 oz    Comment: occationally       No results found for this visit on 06/10/19. Vitals:  /82   Wt 158 lb (71.7 kg)   LMP 10/24/2018 (Approximate)   BMI 28.90 kg/m²   Estimated body mass index is 28.9 kg/m² as calculated from the following:    Height as of 10/19/18: 5' 2\" (1.575 m). Weight as of this encounter: 158 lb (71.7 kg). HPI: here for routine ob visit     PT denies fever, chills, nausea and vomiting       Abdomen: enlarged, gravid, soft, nontender   Speculum exam, thin white discharge in vault, no odor, negative pooling, negative nitrazine  Results reviewed today:    No results found for this visit on 06/10/19. See prenatal vital sign section and fetal assessment section    ASSESSMENT & Plan    Diagnosis Orders   1.  Encounter for supervision of other normal pregnancy in third trimester     2. 32 weeks gestation of pregnancy               I am having Willa Harman maintain her Prenatal MV-Min-Fe Fum-FA-DHA (PRENATAL 1 PO), ondansetron, acetaminophen, fluticasone, azithromycin, and loratadine. Return in about 2 weeks (around 6/24/2019) for ob. There are no Patient Instructions on file for this visit.           Isaiah Diaz,6/10/2019 3:26 PM

## 2019-06-13 LAB
CULTURE: NORMAL
Lab: NORMAL
SPECIMEN DESCRIPTION: NORMAL

## 2019-06-24 ENCOUNTER — TELEPHONE (OUTPATIENT)
Dept: OBGYN | Age: 34
End: 2019-06-24

## 2019-06-24 ENCOUNTER — ROUTINE PRENATAL (OUTPATIENT)
Dept: OBGYN | Age: 34
End: 2019-06-24

## 2019-06-24 VITALS — SYSTOLIC BLOOD PRESSURE: 128 MMHG | WEIGHT: 162.4 LBS | BODY MASS INDEX: 29.7 KG/M2 | DIASTOLIC BLOOD PRESSURE: 80 MMHG

## 2019-06-24 DIAGNOSIS — Z34.83 ENCOUNTER FOR SUPERVISION OF OTHER NORMAL PREGNANCY IN THIRD TRIMESTER: Primary | ICD-10-CM

## 2019-06-24 DIAGNOSIS — O92.70 LACTATION DISORDER: Primary | ICD-10-CM

## 2019-06-24 DIAGNOSIS — Z3A.34 34 WEEKS GESTATION OF PREGNANCY: ICD-10-CM

## 2019-06-24 PROCEDURE — 0502F SUBSEQUENT PRENATAL CARE: CPT | Performed by: ADVANCED PRACTICE MIDWIFE

## 2019-06-24 RX ORDER — BREAST PUMP
EACH MISCELLANEOUS
Qty: 1 EACH | Refills: 0 | Status: SHIPPED | OUTPATIENT
Start: 2019-06-24 | End: 2019-10-10 | Stop reason: ALTCHOICE

## 2019-06-24 NOTE — PROGRESS NOTES
MV-Min-Fe Fum-FA-DHA (PRENATAL 1 PO), ondansetron, acetaminophen, fluticasone, azithromycin, and loratadine. Return in about 1 week (around 7/1/2019) for obgbs. There are no Patient Instructions on file for this visit.           Lyndsey Diaz,6/24/2019 3:20 PM

## 2019-07-01 ENCOUNTER — ROUTINE PRENATAL (OUTPATIENT)
Dept: OBGYN | Age: 34
End: 2019-07-01

## 2019-07-01 ENCOUNTER — HOSPITAL ENCOUNTER (OUTPATIENT)
Age: 34
Setting detail: SPECIMEN
Discharge: HOME | End: 2019-07-01
Payer: COMMERCIAL

## 2019-07-01 VITALS — SYSTOLIC BLOOD PRESSURE: 126 MMHG | DIASTOLIC BLOOD PRESSURE: 78 MMHG | WEIGHT: 161.8 LBS | BODY MASS INDEX: 29.59 KG/M2

## 2019-07-01 DIAGNOSIS — Z3A.35 35 WEEKS GESTATION OF PREGNANCY: ICD-10-CM

## 2019-07-01 DIAGNOSIS — Z34.83 ENCOUNTER FOR SUPERVISION OF OTHER NORMAL PREGNANCY IN THIRD TRIMESTER: Primary | ICD-10-CM

## 2019-07-01 PROCEDURE — 87081 CULTURE SCREEN ONLY: CPT

## 2019-07-01 PROCEDURE — 0502F SUBSEQUENT PRENATAL CARE: CPT | Performed by: ADVANCED PRACTICE MIDWIFE

## 2019-07-01 NOTE — PROGRESS NOTES
Mariposa Melchor is here at 35w1d for:    Chief Complaint   Patient presents with    Routine Prenatal Visit     GBS today. Feeling well. Estimated Due Date: Estimated Date of Delivery: 19    OB History    Para Term  AB Living   3 1 1   1 1   SAB TAB Ectopic Molar Multiple Live Births             1      # Outcome Date GA Lbr Doug/2nd Weight Sex Delivery Anes PTL Lv   3 Current            2 AB 18 10w4d          1 Term 08/21/10 40w0d  7 lb 10 oz (3.459 kg) F Vag-Spont None N LOUISA        Past Medical History:   Diagnosis Date    Abnormal Pap smear of cervix     Repeat pap yearly    Migraine     Recurrent UTI        Past Surgical History:   Procedure Laterality Date    CHOLECYSTECTOMY      DILATION AND CURETTAGE  2018    KNEE SURGERY Left     KNEE SURGERY Left     TONSILLECTOMY         Social History     Tobacco Use   Smoking Status Former Smoker    Packs/day: 0.25   Smokeless Tobacco Never Used   Tobacco Comment    1 year ago        Social History     Substance and Sexual Activity   Alcohol Use No    Alcohol/week: 0.0 oz    Comment: occationally       No results found for this visit on 19. Vitals:  /78   Wt 161 lb 12.8 oz (73.4 kg)   LMP 10/24/2018 (Approximate)   BMI 29.59 kg/m²   Estimated body mass index is 29.59 kg/m² as calculated from the following:    Height as of 10/19/18: 5' 2\" (1.575 m). Weight as of this encounter: 161 lb 12.8 oz (73.4 kg). HPI: here for routine ob visit, denies c/o     PT denies fever, chills, nausea and vomiting       Abdomen: enlarged, gravid, soft, nontender     Results reviewed today:    No results found for this visit on 19. See prenatal vital sign section and fetal assessment section    ASSESSMENT & Plan    Diagnosis Orders   1.  Encounter for supervision of other normal pregnancy in third trimester     2. 35 weeks gestation of pregnancy  Strep B Screen, Vaginal / Rectal             I am having Grover Heath

## 2019-07-04 LAB
CULTURE: NORMAL
Lab: NORMAL
SPECIMEN DESCRIPTION: NORMAL

## 2019-07-09 ENCOUNTER — ROUTINE PRENATAL (OUTPATIENT)
Dept: OBGYN | Age: 34
End: 2019-07-09

## 2019-07-09 VITALS — WEIGHT: 162.4 LBS | BODY MASS INDEX: 29.7 KG/M2 | SYSTOLIC BLOOD PRESSURE: 126 MMHG | DIASTOLIC BLOOD PRESSURE: 78 MMHG

## 2019-07-09 DIAGNOSIS — Z34.93 PRENATAL CARE IN THIRD TRIMESTER: Primary | ICD-10-CM

## 2019-07-09 PROCEDURE — 0502F SUBSEQUENT PRENATAL CARE: CPT | Performed by: OBSTETRICS & GYNECOLOGY

## 2019-07-17 ENCOUNTER — ROUTINE PRENATAL (OUTPATIENT)
Dept: OBGYN | Age: 34
End: 2019-07-17
Payer: COMMERCIAL

## 2019-07-17 VITALS — SYSTOLIC BLOOD PRESSURE: 136 MMHG | BODY MASS INDEX: 30.18 KG/M2 | WEIGHT: 165 LBS | DIASTOLIC BLOOD PRESSURE: 74 MMHG

## 2019-07-17 DIAGNOSIS — Z3A.37 37 WEEKS GESTATION OF PREGNANCY: ICD-10-CM

## 2019-07-17 DIAGNOSIS — O26.843 UTERINE SIZE-DATE DISCREPANCY IN THIRD TRIMESTER: Primary | ICD-10-CM

## 2019-07-17 DIAGNOSIS — Z34.83 ENCOUNTER FOR SUPERVISION OF OTHER NORMAL PREGNANCY IN THIRD TRIMESTER: ICD-10-CM

## 2019-07-17 LAB
ABDOMINAL CIRCUMFERENCE: 36 CM
BIPARIETAL DIAMETER: 9.4 CM
ESTIMATED FETAL WEIGHT: 3715 GRAMS
FEMORAL DIAMETER: NORMAL CM
HC/AC: 0.93
HEAD CIRCUMFERENCE: 33.5 CM

## 2019-07-17 PROCEDURE — 76816 OB US FOLLOW-UP PER FETUS: CPT | Performed by: OBSTETRICS & GYNECOLOGY

## 2019-07-17 PROCEDURE — 0502F SUBSEQUENT PRENATAL CARE: CPT | Performed by: ADVANCED PRACTICE MIDWIFE

## 2019-07-18 DIAGNOSIS — J02.9 SORE THROAT: Primary | ICD-10-CM

## 2019-07-18 NOTE — TELEPHONE ENCOUNTER
Caller called the office today, 35 weeks, woke up with sore throat. Leigha Lesley at office stated she would pass this on but there is no prescription at [facility]. Writer called Harsh Majano CNM and discussed, she stated she would call it in now. Pt notified of this.   KMG<RN

## 2019-07-19 RX ORDER — AZITHROMYCIN 250 MG/1
250 TABLET, FILM COATED ORAL SEE ADMIN INSTRUCTIONS
Qty: 6 TABLET | Refills: 0 | OUTPATIENT
Start: 2019-07-19 | End: 2019-07-24

## 2019-07-22 ENCOUNTER — ROUTINE PRENATAL (OUTPATIENT)
Dept: OBGYN | Age: 34
End: 2019-07-22

## 2019-07-22 VITALS — BODY MASS INDEX: 30.47 KG/M2 | SYSTOLIC BLOOD PRESSURE: 136 MMHG | DIASTOLIC BLOOD PRESSURE: 82 MMHG | WEIGHT: 166.6 LBS

## 2019-07-22 DIAGNOSIS — Z34.83 ENCOUNTER FOR SUPERVISION OF OTHER NORMAL PREGNANCY IN THIRD TRIMESTER: Primary | ICD-10-CM

## 2019-07-22 DIAGNOSIS — Z3A.38 38 WEEKS GESTATION OF PREGNANCY: ICD-10-CM

## 2019-07-22 PROCEDURE — 0502F SUBSEQUENT PRENATAL CARE: CPT | Performed by: ADVANCED PRACTICE MIDWIFE

## 2019-07-22 NOTE — PROGRESS NOTES
maintain her Prenatal MV-Min-Fe Fum-FA-DHA (PRENATAL 1 PO), ondansetron, acetaminophen, fluticasone, loratadine, and Breast Pump. Return for to [facility] for [address] [place] [address] 7/29/19. There are no Patient Instructions on file for this visit.           Margaret Diaz,7/22/2019 3:24 PM

## 2019-07-28 RX ORDER — METHYLERGONOVINE MALEATE 0.2 MG/ML
200 INJECTION INTRAVENOUS PRN
Status: CANCELLED | OUTPATIENT
Start: 2019-07-28

## 2019-07-28 RX ORDER — SODIUM CHLORIDE, SODIUM LACTATE, POTASSIUM CHLORIDE, CALCIUM CHLORIDE 600; 310; 30; 20 MG/100ML; MG/100ML; MG/100ML; MG/100ML
INJECTION, SOLUTION INTRAVENOUS CONTINUOUS
Status: CANCELLED | OUTPATIENT
Start: 2019-07-28

## 2019-07-28 RX ORDER — MISOPROSTOL 100 UG/1
900 TABLET ORAL PRN
Status: CANCELLED | OUTPATIENT
Start: 2019-07-28

## 2019-07-28 RX ORDER — SODIUM CHLORIDE 0.9 % (FLUSH) 0.9 %
10 SYRINGE (ML) INJECTION PRN
Status: CANCELLED | OUTPATIENT
Start: 2019-07-28

## 2019-07-28 RX ORDER — ONDANSETRON 2 MG/ML
4 INJECTION INTRAMUSCULAR; INTRAVENOUS EVERY 6 HOURS PRN
Status: CANCELLED | OUTPATIENT
Start: 2019-07-28

## 2019-07-28 RX ORDER — NALBUPHINE HCL 10 MG/ML
10 AMPUL (ML) INJECTION
Status: CANCELLED | OUTPATIENT
Start: 2019-07-28

## 2019-07-28 RX ORDER — ACETAMINOPHEN 325 MG/1
650 TABLET ORAL EVERY 4 HOURS PRN
Status: CANCELLED | OUTPATIENT
Start: 2019-07-28

## 2019-07-28 RX ORDER — LIDOCAINE HYDROCHLORIDE 10 MG/ML
30 INJECTION, SOLUTION EPIDURAL; INFILTRATION; INTRACAUDAL; PERINEURAL PRN
Status: CANCELLED | OUTPATIENT
Start: 2019-07-28

## 2019-07-28 RX ORDER — SEVOFLURANE 250 ML/250ML
1 LIQUID RESPIRATORY (INHALATION) CONTINUOUS PRN
Status: CANCELLED | OUTPATIENT
Start: 2019-07-28

## 2019-07-28 RX ORDER — SODIUM CHLORIDE 0.9 % (FLUSH) 0.9 %
10 SYRINGE (ML) INJECTION EVERY 12 HOURS SCHEDULED
Status: CANCELLED | OUTPATIENT
Start: 2019-07-28

## 2019-07-28 RX ORDER — CARBOPROST TROMETHAMINE 250 UG/ML
250 INJECTION, SOLUTION INTRAMUSCULAR PRN
Status: CANCELLED | OUTPATIENT
Start: 2019-07-28

## 2019-07-29 ENCOUNTER — ANESTHESIA EVENT (OUTPATIENT)
Dept: LABOR AND DELIVERY | Age: 34
End: 2019-07-29
Payer: COMMERCIAL

## 2019-07-29 ENCOUNTER — ANESTHESIA (OUTPATIENT)
Dept: LABOR AND DELIVERY | Age: 34
End: 2019-07-29
Payer: COMMERCIAL

## 2019-07-29 ENCOUNTER — HOSPITAL ENCOUNTER (INPATIENT)
Age: 34
LOS: 2 days | Discharge: HOME | End: 2019-07-31
Attending: ADVANCED PRACTICE MIDWIFE | Admitting: ADVANCED PRACTICE MIDWIFE
Payer: COMMERCIAL

## 2019-07-29 PROBLEM — Z34.93 ENCOUNTER FOR SUPERVISION OF NORMAL PREGNANCY IN THIRD TRIMESTER: Status: ACTIVE | Noted: 2019-07-29

## 2019-07-29 LAB
ABSOLUTE EOS #: 0.14 K/UL (ref 0–0.44)
ABSOLUTE IMMATURE GRANULOCYTE: 0.04 K/UL (ref 0–0.3)
ABSOLUTE LYMPH #: 1.37 K/UL (ref 1.1–3.7)
ABSOLUTE MONO #: 0.94 K/UL (ref 0.1–1.2)
AMPHETAMINE SCREEN URINE: NEGATIVE
BARBITURATE SCREEN URINE: NEGATIVE
BASOPHILS # BLD: 0 % (ref 0–2)
BASOPHILS ABSOLUTE: <0.03 K/UL (ref 0–0.2)
BENZODIAZEPINE SCREEN, URINE: NEGATIVE
BUPRENORPHINE URINE: NEGATIVE
CANNABINOID SCREEN URINE: NEGATIVE
COCAINE METABOLITE, URINE: NEGATIVE
DIFFERENTIAL TYPE: ABNORMAL
EOSINOPHILS RELATIVE PERCENT: 1 % (ref 1–4)
HCT VFR BLD CALC: 36.8 % (ref 36.3–47.1)
HEMOGLOBIN: 12 G/DL (ref 11.9–15.1)
IMMATURE GRANULOCYTES: 0 %
LYMPHOCYTES # BLD: 14 % (ref 24–43)
MCH RBC QN AUTO: 30.5 PG (ref 25.2–33.5)
MCHC RBC AUTO-ENTMCNC: 32.6 G/DL (ref 28.4–34.8)
MCV RBC AUTO: 93.4 FL (ref 82.6–102.9)
MDMA URINE: NORMAL
METHADONE SCREEN, URINE: NEGATIVE
METHAMPHETAMINE, URINE: NEGATIVE
MONOCYTES # BLD: 10 % (ref 3–12)
NRBC AUTOMATED: 0 PER 100 WBC
OPIATES, URINE: NEGATIVE
OXYCODONE SCREEN URINE: NEGATIVE
PDW BLD-RTO: 13 % (ref 11.8–14.4)
PHENCYCLIDINE, URINE: NEGATIVE
PLATELET # BLD: 167 K/UL (ref 138–453)
PLATELET ESTIMATE: ABNORMAL
PMV BLD AUTO: 12.3 FL (ref 8.1–13.5)
PROPOXYPHENE, URINE: NEGATIVE
RBC # BLD: 3.94 M/UL (ref 3.95–5.11)
RBC # BLD: ABNORMAL 10*6/UL
SEG NEUTROPHILS: 75 % (ref 36–65)
SEGMENTED NEUTROPHILS ABSOLUTE COUNT: 7.19 K/UL (ref 1.5–8.1)
TEST INFORMATION: NORMAL
TRICYCLIC ANTIDEPRESSANTS, UR: NEGATIVE
WBC # BLD: 9.7 K/UL (ref 3.5–11.3)
WBC # BLD: ABNORMAL 10*3/UL

## 2019-07-29 PROCEDURE — 1220000000 HC SEMI PRIVATE OB R&B

## 2019-07-29 PROCEDURE — 7200000001 HC VAGINAL DELIVERY

## 2019-07-29 PROCEDURE — 6360000002 HC RX W HCPCS: Performed by: NURSE ANESTHETIST, CERTIFIED REGISTERED

## 2019-07-29 PROCEDURE — 80306 DRUG TEST PRSMV INSTRMNT: CPT

## 2019-07-29 PROCEDURE — 36415 COLL VENOUS BLD VENIPUNCTURE: CPT

## 2019-07-29 PROCEDURE — 2580000003 HC RX 258: Performed by: ADVANCED PRACTICE MIDWIFE

## 2019-07-29 PROCEDURE — 2500000003 HC RX 250 WO HCPCS: Performed by: NURSE ANESTHETIST, CERTIFIED REGISTERED

## 2019-07-29 PROCEDURE — 3700000025 EPIDURAL BLOCK: Performed by: NURSE ANESTHETIST, CERTIFIED REGISTERED

## 2019-07-29 PROCEDURE — 59400 OBSTETRICAL CARE: CPT | Performed by: ADVANCED PRACTICE MIDWIFE

## 2019-07-29 PROCEDURE — 85025 COMPLETE CBC W/AUTO DIFF WBC: CPT

## 2019-07-29 PROCEDURE — 51702 INSERT TEMP BLADDER CATH: CPT

## 2019-07-29 PROCEDURE — 6360000002 HC RX W HCPCS: Performed by: ADVANCED PRACTICE MIDWIFE

## 2019-07-29 PROCEDURE — 6370000000 HC RX 637 (ALT 250 FOR IP): Performed by: ADVANCED PRACTICE MIDWIFE

## 2019-07-29 RX ORDER — ROPIVACAINE HYDROCHLORIDE 2 MG/ML
INJECTION, SOLUTION EPIDURAL; INFILTRATION; PERINEURAL CONTINUOUS PRN
Status: DISCONTINUED | OUTPATIENT
Start: 2019-07-29 | End: 2019-07-29 | Stop reason: SDUPTHER

## 2019-07-29 RX ORDER — DOCUSATE SODIUM 100 MG/1
100 CAPSULE, LIQUID FILLED ORAL 2 TIMES DAILY PRN
Status: DISCONTINUED | OUTPATIENT
Start: 2019-07-29 | End: 2019-07-31 | Stop reason: HOSPADM

## 2019-07-29 RX ORDER — NALBUPHINE HCL 10 MG/ML
5 AMPUL (ML) INJECTION EVERY 4 HOURS PRN
Status: DISCONTINUED | OUTPATIENT
Start: 2019-07-29 | End: 2019-07-31 | Stop reason: HOSPADM

## 2019-07-29 RX ORDER — MISOPROSTOL 100 UG/1
900 TABLET ORAL PRN
Status: DISCONTINUED | OUTPATIENT
Start: 2019-07-29 | End: 2019-07-31 | Stop reason: HOSPADM

## 2019-07-29 RX ORDER — LANOLIN 100 %
OINTMENT (GRAM) TOPICAL PRN
Status: DISCONTINUED | OUTPATIENT
Start: 2019-07-29 | End: 2019-07-31 | Stop reason: HOSPADM

## 2019-07-29 RX ORDER — SODIUM CHLORIDE, SODIUM LACTATE, POTASSIUM CHLORIDE, CALCIUM CHLORIDE 600; 310; 30; 20 MG/100ML; MG/100ML; MG/100ML; MG/100ML
INJECTION, SOLUTION INTRAVENOUS CONTINUOUS
Status: DISCONTINUED | OUTPATIENT
Start: 2019-07-29 | End: 2019-07-31 | Stop reason: HOSPADM

## 2019-07-29 RX ORDER — ROPIVACAINE HYDROCHLORIDE 2 MG/ML
10 INJECTION, SOLUTION EPIDURAL; INFILTRATION; PERINEURAL CONTINUOUS
Status: DISCONTINUED | OUTPATIENT
Start: 2019-07-29 | End: 2019-07-31 | Stop reason: HOSPADM

## 2019-07-29 RX ORDER — LIDOCAINE HYDROCHLORIDE 20 MG/ML
INJECTION, SOLUTION EPIDURAL; INFILTRATION; INTRACAUDAL; PERINEURAL PRN
Status: DISCONTINUED | OUTPATIENT
Start: 2019-07-29 | End: 2019-07-29 | Stop reason: SDUPTHER

## 2019-07-29 RX ORDER — SEVOFLURANE 250 ML/250ML
1 LIQUID RESPIRATORY (INHALATION) CONTINUOUS PRN
Status: DISCONTINUED | OUTPATIENT
Start: 2019-07-29 | End: 2019-07-31 | Stop reason: HOSPADM

## 2019-07-29 RX ORDER — ONDANSETRON 2 MG/ML
4 INJECTION INTRAMUSCULAR; INTRAVENOUS EVERY 6 HOURS PRN
Status: DISCONTINUED | OUTPATIENT
Start: 2019-07-29 | End: 2019-07-31 | Stop reason: HOSPADM

## 2019-07-29 RX ORDER — SODIUM CHLORIDE 0.9 % (FLUSH) 0.9 %
10 SYRINGE (ML) INJECTION EVERY 12 HOURS SCHEDULED
Status: DISCONTINUED | OUTPATIENT
Start: 2019-07-29 | End: 2019-07-31 | Stop reason: HOSPADM

## 2019-07-29 RX ORDER — IBUPROFEN 800 MG/1
800 TABLET ORAL EVERY 8 HOURS
Status: DISCONTINUED | OUTPATIENT
Start: 2019-07-29 | End: 2019-07-31 | Stop reason: HOSPADM

## 2019-07-29 RX ORDER — ACETAMINOPHEN 325 MG/1
650 TABLET ORAL EVERY 4 HOURS PRN
Status: DISCONTINUED | OUTPATIENT
Start: 2019-07-29 | End: 2019-07-31 | Stop reason: HOSPADM

## 2019-07-29 RX ORDER — NALBUPHINE HCL 10 MG/ML
10 AMPUL (ML) INJECTION
Status: DISCONTINUED | OUTPATIENT
Start: 2019-07-29 | End: 2019-07-31 | Stop reason: HOSPADM

## 2019-07-29 RX ORDER — METHYLERGONOVINE MALEATE 0.2 MG/ML
200 INJECTION INTRAVENOUS
Status: ACTIVE | OUTPATIENT
Start: 2019-07-29 | End: 2019-07-29

## 2019-07-29 RX ORDER — SODIUM CHLORIDE 0.9 % (FLUSH) 0.9 %
10 SYRINGE (ML) INJECTION PRN
Status: DISCONTINUED | OUTPATIENT
Start: 2019-07-29 | End: 2019-07-31 | Stop reason: HOSPADM

## 2019-07-29 RX ORDER — CARBOPROST TROMETHAMINE 250 UG/ML
250 INJECTION, SOLUTION INTRAMUSCULAR PRN
Status: DISCONTINUED | OUTPATIENT
Start: 2019-07-29 | End: 2019-07-31 | Stop reason: HOSPADM

## 2019-07-29 RX ORDER — LIDOCAINE HYDROCHLORIDE 10 MG/ML
30 INJECTION, SOLUTION EPIDURAL; INFILTRATION; INTRACAUDAL; PERINEURAL PRN
Status: DISCONTINUED | OUTPATIENT
Start: 2019-07-29 | End: 2019-07-31 | Stop reason: HOSPADM

## 2019-07-29 RX ORDER — METHYLERGONOVINE MALEATE 0.2 MG/ML
200 INJECTION INTRAVENOUS PRN
Status: DISCONTINUED | OUTPATIENT
Start: 2019-07-29 | End: 2019-07-31 | Stop reason: HOSPADM

## 2019-07-29 RX ORDER — NALOXONE HYDROCHLORIDE 0.4 MG/ML
0.4 INJECTION, SOLUTION INTRAMUSCULAR; INTRAVENOUS; SUBCUTANEOUS PRN
Status: DISCONTINUED | OUTPATIENT
Start: 2019-07-29 | End: 2019-07-31 | Stop reason: HOSPADM

## 2019-07-29 RX ORDER — EPHEDRINE SULFATE 50 MG/ML
5 INJECTION, SOLUTION INTRAVENOUS EVERY 5 MIN PRN
Status: DISCONTINUED | OUTPATIENT
Start: 2019-07-29 | End: 2019-07-31 | Stop reason: HOSPADM

## 2019-07-29 RX ADMIN — LIDOCAINE HYDROCHLORIDE 3 ML: 20 INJECTION, SOLUTION EPIDURAL; INFILTRATION; INTRACAUDAL at 13:04

## 2019-07-29 RX ADMIN — LIDOCAINE HYDROCHLORIDE 4 ML: 20 INJECTION, SOLUTION EPIDURAL; INFILTRATION; INTRACAUDAL at 12:58

## 2019-07-29 RX ADMIN — LIDOCAINE HYDROCHLORIDE 3 ML: 20 INJECTION, SOLUTION EPIDURAL; INFILTRATION; INTRACAUDAL at 13:01

## 2019-07-29 RX ADMIN — IBUPROFEN 800 MG: 800 TABLET, FILM COATED ORAL at 18:17

## 2019-07-29 RX ADMIN — ACETAMINOPHEN 650 MG: 325 TABLET, FILM COATED ORAL at 22:02

## 2019-07-29 RX ADMIN — ROPIVACAINE HYDROCHLORIDE 10 ML/HR: 2 INJECTION, SOLUTION EPIDURAL; INFILTRATION at 13:10

## 2019-07-29 RX ADMIN — Medication 1 MILLI-UNITS/MIN: at 07:17

## 2019-07-29 RX ADMIN — SODIUM CHLORIDE, POTASSIUM CHLORIDE, SODIUM LACTATE AND CALCIUM CHLORIDE: 600; 310; 30; 20 INJECTION, SOLUTION INTRAVENOUS at 06:36

## 2019-07-29 ASSESSMENT — PAIN SCALES - GENERAL
PAINLEVEL_OUTOF10: 3
PAINLEVEL_OUTOF10: 5

## 2019-07-29 NOTE — PLAN OF CARE
Problem: Breathing Pattern - Ineffective:  Goal: Able to breathe comfortably  Description  Able to breathe comfortably  Outcome: Met This Shift     Problem: KNOWLEDGE DEFICIT  Goal: Patient/S.O. demonstrates understanding of disease process, treatment plan, medications, and discharge instructions.   Outcome: Met This Shift

## 2019-07-29 NOTE — ANESTHESIA PRE PROCEDURE
12/28/2011    HGB 12.0 07/29/2019    HCT 36.8 07/29/2019    MCV 93.4 07/29/2019    RDW 13.0 07/29/2019     07/29/2019     12/28/2011       CMP:   Lab Results   Component Value Date     04/12/2019    K 3.5 04/12/2019     04/12/2019    CO2 19 04/12/2019    BUN 4 04/12/2019    CREATININE 0.46 04/12/2019    GFRAA >60 04/12/2019    LABGLOM >60 04/12/2019    GLUCOSE 116 05/13/2019    GLUCOSE 81 12/28/2011    PROT 6.8 04/12/2019    CALCIUM 8.9 04/12/2019    BILITOT 0.45 04/12/2019    ALKPHOS 77 04/12/2019    AST 24 04/12/2019    ALT 28 04/12/2019       POC Tests: No results for input(s): POCGLU, POCNA, POCK, POCCL, POCBUN, POCHEMO, POCHCT in the last 72 hours. Coags:   Lab Results   Component Value Date    APTT 28.7 04/12/2019       HCG (If Applicable):   Lab Results   Component Value Date    PREGTESTUR Positive 12/10/2018    HCGQUANT 21,192 (H) 12/06/2018        ABGs: No results found for: PHART, PO2ART, CUP3ZCI, RED8IYT, BEART, E5BKJUSE     Type & Screen (If Applicable):  No results found for: LABABO, [address]    Anesthesia Evaluation  Patient summary reviewed and Nursing notes reviewed no history of anesthetic complications:   Airway: Mallampati: III  TM distance: >3 FB   Neck ROM: full  Mouth opening: > = 3 FB Dental: normal exam         Pulmonary:Negative Pulmonary ROS breath sounds clear to auscultation                             Cardiovascular:  Exercise tolerance: good (>4 METS),           Rhythm: regular  Rate: normal                    Neuro/Psych:   (+) headaches: migraine headaches,             GI/Hepatic/Renal: Neg GI/Hepatic/Renal ROS            Endo/Other: Negative Endo/Other ROS                    Abdominal:           Vascular: negative vascular ROS. Anesthesia Plan      epidural     ASA 2     (Plan discussed with patient. Risks and benefits explained, all questions answered. Pt is agreeable to Epidural with GA/Spinal as backup if needed. )        Anesthetic plan and risks discussed with patient.                       MONTRELL Huitron - OMAR   7/29/2019

## 2019-07-29 NOTE — H&P
Department of Obstetrics and Gynecology   Obstetrics History and Physical  Power Mckinney. Royer Dozier  H&P Admission Inpatient  Note        CHIEF COMPLAINT:  Request for induction at term    HISTORY OF PRESENT ILLNESS:      The patient is a 35 y.o. female at 36w3d. OB History        3    Para   1    Term   1            AB   1    Living   1       SAB        TAB        Ectopic        Molar        Multiple        Live Births   1            Patient presents with a chief complaint as above and is being admitted for induction    Estimated Due Date: Estimated Date of Delivery: 19    PRENATAL CARE:    Complicated by: none    PAST OB HISTORY:  OB History        3    Para   1    Term   1            AB   1    Living   1       SAB        TAB        Ectopic        Molar        Multiple        Live Births   1                Past Medical History:        Diagnosis Date    Abnormal Pap smear of cervix     Repeat pap yearly    Migraine     Recurrent UTI      Past Surgical History:        Procedure Laterality Date    CHOLECYSTECTOMY      DILATION AND CURETTAGE  2018    KNEE SURGERY Left     KNEE SURGERY Left     TONSILLECTOMY       Allergies:  Pcn [penicillins]; Percocet [oxycodone-acetaminophen]; and Vicodin [hydrocodone-acetaminophen]    Social History:    Social History     Socioeconomic History    Marital status:      Spouse name: Not on file    Number of children: Not on file    Years of education: Not on file    Highest education level: Not on file   Occupational History    Not on file   Social Needs    Financial resource strain: Not on file    Food insecurity:     Worry: Not on file     Inability: Not on file    Transportation needs:     Medical: Not on file     Non-medical: Not on file   Tobacco Use    Smoking status: Former Smoker     Packs/day: 0.25    Smokeless tobacco: Never Used    Tobacco comment: 1 year ago   Substance and Sexual Activity    Alcohol use:  No

## 2019-07-30 PROBLEM — Z34.93 ENCOUNTER FOR SUPERVISION OF NORMAL PREGNANCY IN THIRD TRIMESTER: Status: RESOLVED | Noted: 2019-07-29 | Resolved: 2019-07-30

## 2019-07-30 PROCEDURE — 1220000000 HC SEMI PRIVATE OB R&B

## 2019-07-30 PROCEDURE — 6370000000 HC RX 637 (ALT 250 FOR IP): Performed by: ADVANCED PRACTICE MIDWIFE

## 2019-07-30 PROCEDURE — 99024 POSTOP FOLLOW-UP VISIT: CPT | Performed by: ADVANCED PRACTICE MIDWIFE

## 2019-07-30 RX ADMIN — IBUPROFEN 800 MG: 800 TABLET, FILM COATED ORAL at 19:00

## 2019-07-30 RX ADMIN — IBUPROFEN 800 MG: 800 TABLET, FILM COATED ORAL at 09:21

## 2019-07-30 RX ADMIN — IBUPROFEN 800 MG: 800 TABLET, FILM COATED ORAL at 01:34

## 2019-07-30 RX ADMIN — ACETAMINOPHEN 650 MG: 325 TABLET, FILM COATED ORAL at 05:32

## 2019-07-30 ASSESSMENT — PAIN SCALES - GENERAL
PAINLEVEL_OUTOF10: 2
PAINLEVEL_OUTOF10: 4
PAINLEVEL_OUTOF10: 2
PAINLEVEL_OUTOF10: 2

## 2019-07-30 NOTE — PROGRESS NOTES
07/29/2019    RBC 3.94 07/29/2019    RBC 4.19 12/28/2011    HGB 12.0 07/29/2019    HCT 36.8 07/29/2019    MCV 93.4 07/29/2019    MCH 30.5 07/29/2019    MCHC 32.6 07/29/2019    RDW 13.0 07/29/2019     07/29/2019     12/28/2011    MPV 12.3 07/29/2019         ASSESSMENT :      Patient Active Hospital Problem List:   Encounter for supervision of normal pregnancy in third trimester (7/29/2019)    Assessment: delivered      Normal delivery ()             Plan:  D/c home, rto 6 weeks, routine instructions

## 2019-07-31 VITALS
BODY MASS INDEX: 30.66 KG/M2 | SYSTOLIC BLOOD PRESSURE: 133 MMHG | HEIGHT: 62 IN | OXYGEN SATURATION: 100 % | HEART RATE: 72 BPM | DIASTOLIC BLOOD PRESSURE: 71 MMHG | RESPIRATION RATE: 18 BRPM | WEIGHT: 166.6 LBS | TEMPERATURE: 97.8 F

## 2019-07-31 PROCEDURE — 3E033VJ INTRODUCTION OF OTHER HORMONE INTO PERIPHERAL VEIN, PERCUTANEOUS APPROACH: ICD-10-PCS | Performed by: ADVANCED PRACTICE MIDWIFE

## 2019-07-31 PROCEDURE — 10907ZC DRAINAGE OF AMNIOTIC FLUID, THERAPEUTIC FROM PRODUCTS OF CONCEPTION, VIA NATURAL OR ARTIFICIAL OPENING: ICD-10-PCS | Performed by: ADVANCED PRACTICE MIDWIFE

## 2019-07-31 PROCEDURE — 99024 POSTOP FOLLOW-UP VISIT: CPT | Performed by: ADVANCED PRACTICE MIDWIFE

## 2019-07-31 PROCEDURE — 6370000000 HC RX 637 (ALT 250 FOR IP): Performed by: ADVANCED PRACTICE MIDWIFE

## 2019-07-31 RX ORDER — AZITHROMYCIN 250 MG/1
250 TABLET, FILM COATED ORAL SEE ADMIN INSTRUCTIONS
Qty: 6 TABLET | Refills: 0 | Status: SHIPPED | OUTPATIENT
Start: 2019-07-31 | End: 2019-08-05

## 2019-07-31 RX ADMIN — ACETAMINOPHEN 650 MG: 325 TABLET, FILM COATED ORAL at 08:22

## 2019-07-31 RX ADMIN — IBUPROFEN 800 MG: 800 TABLET, FILM COATED ORAL at 03:31

## 2019-07-31 RX ADMIN — DOCUSATE SODIUM 100 MG: 100 CAPSULE, LIQUID FILLED ORAL at 08:23

## 2019-07-31 ASSESSMENT — PAIN SCALES - GENERAL
PAINLEVEL_OUTOF10: 5
PAINLEVEL_OUTOF10: 3

## 2019-07-31 NOTE — PLAN OF CARE
Problem: Discharge Planning:  Goal: Discharged to appropriate level of care  Description  Discharged to appropriate level of care  Outcome: Ongoing     Problem: Constipation:  Goal: Bowel elimination is within specified parameters  Description  Bowel elimination is within specified parameters  Outcome: Ongoing     Problem: Fluid Volume - Imbalance:  Goal: Absence of imbalanced fluid volume signs and symptoms  Description  Absence of imbalanced fluid volume signs and symptoms  Outcome: Ongoing  Goal: Absence of postpartum hemorrhage signs and symptoms  Description  Absence of postpartum hemorrhage signs and symptoms  Outcome: Ongoing     Problem: Infection - Risk of, Puerperal Infection:  Goal: Will show no infection signs and symptoms  Description  Will show no infection signs and symptoms  Outcome: Ongoing     Problem: Mood - Altered:  Goal: Mood stable  Description  Mood stable  Outcome: Met This Shift     Problem: Pain - Acute:  Goal: Pain level will decrease  Description  Pain level will decrease  Outcome: Met This Shift

## 2019-07-31 NOTE — PLAN OF CARE
Problem: Discharge Planning:  Goal: Discharged to appropriate level of care  Description  Discharged to appropriate level of care  7/31/2019 1015 by Newton Lincoln RN  Outcome: Ongoing  7/30/2019 2147 by Ingrid Nunez RN  Outcome: Ongoing     Problem: Constipation:  Goal: Bowel elimination is within specified parameters  Description  Bowel elimination is within specified parameters  7/31/2019 1015 by Newton Lincoln RN  Outcome: Ongoing  7/30/2019 2147 by Ingrid Nunez RN  Outcome: Ongoing     Problem: Fluid Volume - Imbalance:  Goal: Absence of imbalanced fluid volume signs and symptoms  Description  Absence of imbalanced fluid volume signs and symptoms  7/31/2019 1015 by Newton Lincoln RN  Outcome: Ongoing  7/30/2019 2147 by Ingrid Nunez RN  Outcome: Ongoing  Goal: Absence of postpartum hemorrhage signs and symptoms  Description  Absence of postpartum hemorrhage signs and symptoms  7/31/2019 1015 by Newton Lincoln RN  Outcome: Ongoing  7/30/2019 2147 by Ingrid Nunez RN  Outcome: Ongoing     Problem: Infection - Risk of, Puerperal Infection:  Goal: Will show no infection signs and symptoms  Description  Will show no infection signs and symptoms  7/31/2019 1015 by Newton Lincoln RN  Outcome: Ongoing  7/30/2019 2147 by Ingrid Nunez RN  Outcome: Ongoing     Problem: Mood - Altered:  Goal: Mood stable  Description  Mood stable  7/31/2019 1015 by Newton Lincoln RN  Outcome: Ongoing  7/30/2019 2147 by Ingrid Nunez RN  Outcome: Met This Shift     Problem: Pain - Acute:  Goal: Pain level will decrease  Description  Pain level will decrease  7/31/2019 1015 by Newton Lincoln RN  Outcome: Ongoing  7/30/2019 2147 by Ingrid Nunez RN  Outcome: Met This Shift

## 2019-08-01 NOTE — L&D DELIVERY NOTE
Mother's Information    Labor Events     labor?:  No  Rupture type:  Artificial=AROM, Intact  Fluid color:  Yellow, Pink  Fluid odor:  None     Mother Delivery Information    Episiotomy:  None  Lacerations:  None  Repair Suture:  None  Surgical or Additional Est. Blood Loss (mL):  0 (View Only):  Edit in Flowsheets   Combined Est. Blood Loss (mL):  0        Gabriele Freeport [[id number]]    Labor Events     labor?:  No   steroids?:  None  Cervical ripening date/time:     Antibiotics received during labor?:  No  Rupture date/time: 19 12:09:00   Rupture type:  Artificial=AROM, Intact  Fluid color:  Yellow, Pink  Fluid odor:  None  Induction:  Oxytocin  Indications for induction:  Elective  Labor complications:  None          Labor Event Times    Labor onset date/time: 1937   Dilation complete date/time:   19   Start pushin2019 1632   Decision time (emergent ):        Anesthesia    Method:  Epidural     Assisted Delivery Details    Forceps attempted?:  No  Vacuum extractor attempted?:  No     Document Additional Attempt       Document Additional Attempt             Shoulder Dystocia    Shoulder dystocia present?:  No  Add Second Maneuver  Add Third Maneuver  Add Fourth Maneuver  Add Fifth Maneuver  Add Sixth Maneuver  Add Seventh Maneuver  Add Eighth Maneuver  Add Ninth Maneuver      Presentation    Presentation:  Vertex     [place] Information    Head delivery date/time:  2019 16:51:00   Changing the 's delivery date/time could affect patient care.:     Delivery date/time:   19   Delivery type:  Vaginal, Spontaneous    Details:         Delivery Providers    Delivering clinician:  MONTRELL Brand CNM   Provider Role    Annette Brantley RN Registered Nurse    Santo Haq RN Registered Nurse    Nasim Alvarado RN Registered Nurse      Cord    Vessels:  3 Vessels  Complications:  None  Delayed cord clamping?:  Yes  Cord clamped date/time:  2019 1658  Cord blood disposition:  Lab  Gases sent?:  No  Stem cell collection (by provider): No     Placenta    Date/time:  2019 17:01:16  Removal:  Spontaneous  Appearance:  Intact  Disposition:  Discarded     Delivery Resuscitation    Method:  Stimulation, Bulb Suction     Apgars    Living status:  Living  Apgars   1 Minute:   5 Minute:   10 Minute 15 Minute 20 Minute   Skin Color: 1  1       Heart Rate: 2  2       Reflex Irritability: 2  2       Muscle Tone: 2  2       Respiratory Effort: 2  2       Total: 9  9               Apgars Assigned By:  ERNESTO Pineda RN     Skin to Skin    Skin to skin initiation date/time: 19 16:55:00   Skin to skin with:   Mother  Skin to skin end date/time:     Breastfeeding initiated date/time:  2019 17:25:00     [place] Measurements    Weight:  3854 g Length:  50.8 cm   Head circumference:  35.6 cm    ID band #:  [id number]       Delivery Information    Episiotomy:  None  Perineal lacerations:  None    Vaginal laceration:  No    Cervical laceration:  No    Surgical or additional est. blood loss (mL):  0 (View Only):  Edit in Flowsheets   Combined est. blood loss (mL):  0  Repair suture:  None     Vaginal Delivery Counts    Initial count personnel:  Cl Durand RN  Initial count verified by:  Issa Stewart CNM   4x4:   Needles:   Instruments:   Lap Pads:   Sponges:     Initial counts:          Final counts:          Final count personnel:  Cl Durand RN  Final count verified by:  Issa Stewart CNM  Accurate final count?:  Yes  Final vaginal sweep completed:  vaginal sweep not performed     Other Procedures    Procedures:  None     Labor Length    1st stage:  7h 53m  2nd stage:  0h 20m  3rd stage:  0h 10m

## 2019-09-23 ENCOUNTER — POSTPARTUM VISIT (OUTPATIENT)
Dept: OBGYN | Age: 34
End: 2019-09-23

## 2019-09-23 VITALS
HEIGHT: 62 IN | DIASTOLIC BLOOD PRESSURE: 80 MMHG | BODY MASS INDEX: 26.87 KG/M2 | SYSTOLIC BLOOD PRESSURE: 136 MMHG | WEIGHT: 146 LBS

## 2019-09-23 LAB — HGB, POC: 11.6

## 2019-09-23 PROCEDURE — 0503F POSTPARTUM CARE VISIT: CPT | Performed by: ADVANCED PRACTICE MIDWIFE

## 2019-10-02 ENCOUNTER — TELEPHONE (OUTPATIENT)
Dept: OBGYN | Age: 34
End: 2019-10-02

## 2019-10-07 ENCOUNTER — HOSPITAL ENCOUNTER (EMERGENCY)
Age: 34
Discharge: HOME | End: 2019-10-07
Payer: COMMERCIAL

## 2019-10-07 VITALS
OXYGEN SATURATION: 98 % | RESPIRATION RATE: 14 BRPM | SYSTOLIC BLOOD PRESSURE: 141 MMHG | TEMPERATURE: 97.1 F | HEART RATE: 71 BPM | DIASTOLIC BLOOD PRESSURE: 75 MMHG

## 2019-10-07 DIAGNOSIS — M54.50 ACUTE BILATERAL LOW BACK PAIN WITHOUT SCIATICA: Primary | ICD-10-CM

## 2019-10-07 LAB
ABSOLUTE EOS #: 0.1 K/UL (ref 0–0.44)
ABSOLUTE IMMATURE GRANULOCYTE: <0.03 K/UL (ref 0–0.3)
ABSOLUTE LYMPH #: 2.18 K/UL (ref 1.1–3.7)
ABSOLUTE MONO #: 0.85 K/UL (ref 0.1–1.2)
ALBUMIN SERPL-MCNC: 4.3 G/DL (ref 3.5–5.2)
ALBUMIN/GLOBULIN RATIO: 1.5 (ref 1–2.5)
ALP BLD-CCNC: 100 U/L (ref 35–104)
ALT SERPL-CCNC: 12 U/L (ref 5–33)
ANION GAP SERPL CALCULATED.3IONS-SCNC: 13 MMOL/L (ref 9–17)
AST SERPL-CCNC: 17 U/L
BASOPHILS # BLD: 0 % (ref 0–2)
BASOPHILS ABSOLUTE: <0.03 K/UL (ref 0–0.2)
BILIRUB SERPL-MCNC: 0.96 MG/DL (ref 0.3–1.2)
BILIRUBIN URINE: NEGATIVE
BUN BLDV-MCNC: 15 MG/DL (ref 6–20)
BUN/CREAT BLD: 24 (ref 9–20)
CALCIUM SERPL-MCNC: 9 MG/DL (ref 8.6–10.4)
CHLORIDE BLD-SCNC: 103 MMOL/L (ref 98–107)
CO2: 22 MMOL/L (ref 20–31)
COLOR: YELLOW
COMMENT UA: NORMAL
CREAT SERPL-MCNC: 0.62 MG/DL (ref 0.5–0.9)
DIFFERENTIAL TYPE: ABNORMAL
EOSINOPHILS RELATIVE PERCENT: 1 % (ref 1–4)
GFR AFRICAN AMERICAN: >60 ML/MIN
GFR NON-AFRICAN AMERICAN: >60 ML/MIN
GFR SERPL CREATININE-BSD FRML MDRD: ABNORMAL ML/MIN/{1.73_M2}
GFR SERPL CREATININE-BSD FRML MDRD: ABNORMAL ML/MIN/{1.73_M2}
GLUCOSE BLD-MCNC: 90 MG/DL (ref 70–99)
GLUCOSE URINE: NEGATIVE
HCG(URINE) PREGNANCY TEST: NEGATIVE
HCT VFR BLD CALC: 36.5 % (ref 36.3–47.1)
HEMOGLOBIN: 12 G/DL (ref 11.9–15.1)
IMMATURE GRANULOCYTES: 0 %
KETONES, URINE: NEGATIVE
LEUKOCYTE ESTERASE, URINE: NEGATIVE
LYMPHOCYTES # BLD: 24 % (ref 24–43)
MCH RBC QN AUTO: 31 PG (ref 25.2–33.5)
MCHC RBC AUTO-ENTMCNC: 32.9 G/DL (ref 28.4–34.8)
MCV RBC AUTO: 94.3 FL (ref 82.6–102.9)
MONOCYTES # BLD: 9 % (ref 3–12)
NITRITE, URINE: NEGATIVE
NRBC AUTOMATED: 0 PER 100 WBC
PDW BLD-RTO: 12.4 % (ref 11.8–14.4)
PH UA: 6 (ref 5–9)
PLATELET # BLD: 188 K/UL (ref 138–453)
PLATELET ESTIMATE: ABNORMAL
PMV BLD AUTO: 10.8 FL (ref 8.1–13.5)
POTASSIUM SERPL-SCNC: 3.9 MMOL/L (ref 3.7–5.3)
PROTEIN UA: NEGATIVE
RBC # BLD: 3.87 M/UL (ref 3.95–5.11)
RBC # BLD: ABNORMAL 10*6/UL
SEG NEUTROPHILS: 66 % (ref 36–65)
SEGMENTED NEUTROPHILS ABSOLUTE COUNT: 6.02 K/UL (ref 1.5–8.1)
SODIUM BLD-SCNC: 138 MMOL/L (ref 135–144)
SPECIFIC GRAVITY UA: 1.02 (ref 1.01–1.02)
TOTAL PROTEIN: 7.2 G/DL (ref 6.4–8.3)
TURBIDITY: CLEAR
URINE HGB: NEGATIVE
UROBILINOGEN, URINE: NORMAL
WBC # BLD: 9.2 K/UL (ref 3.5–11.3)
WBC # BLD: ABNORMAL 10*3/UL

## 2019-10-07 PROCEDURE — 81025 URINE PREGNANCY TEST: CPT

## 2019-10-07 PROCEDURE — 36415 COLL VENOUS BLD VENIPUNCTURE: CPT

## 2019-10-07 PROCEDURE — 6360000002 HC RX W HCPCS: Performed by: FAMILY MEDICINE

## 2019-10-07 PROCEDURE — 80053 COMPREHEN METABOLIC PANEL: CPT

## 2019-10-07 PROCEDURE — 85025 COMPLETE CBC W/AUTO DIFF WBC: CPT

## 2019-10-07 PROCEDURE — 81003 URINALYSIS AUTO W/O SCOPE: CPT

## 2019-10-07 PROCEDURE — 99283 EMERGENCY DEPT VISIT LOW MDM: CPT

## 2019-10-07 PROCEDURE — 96374 THER/PROPH/DIAG INJ IV PUSH: CPT

## 2019-10-07 PROCEDURE — 2580000003 HC RX 258: Performed by: FAMILY MEDICINE

## 2019-10-07 RX ORDER — 0.9 % SODIUM CHLORIDE 0.9 %
1000 INTRAVENOUS SOLUTION INTRAVENOUS ONCE
Status: COMPLETED | OUTPATIENT
Start: 2019-10-07 | End: 2019-10-07

## 2019-10-07 RX ORDER — KETOROLAC TROMETHAMINE 30 MG/ML
30 INJECTION, SOLUTION INTRAMUSCULAR; INTRAVENOUS ONCE
Status: COMPLETED | OUTPATIENT
Start: 2019-10-07 | End: 2019-10-07

## 2019-10-07 RX ADMIN — KETOROLAC TROMETHAMINE 30 MG: 30 INJECTION, SOLUTION INTRAMUSCULAR at 05:14

## 2019-10-07 RX ADMIN — SODIUM CHLORIDE 1000 ML: 9 INJECTION, SOLUTION INTRAVENOUS at 05:10

## 2019-10-07 ASSESSMENT — PAIN DESCRIPTION - PAIN TYPE: TYPE: ACUTE PAIN

## 2019-10-07 ASSESSMENT — PAIN SCALES - GENERAL
PAINLEVEL_OUTOF10: 4
PAINLEVEL_OUTOF10: 8
PAINLEVEL_OUTOF10: 8

## 2019-10-07 ASSESSMENT — PAIN DESCRIPTION - DESCRIPTORS: DESCRIPTORS: ACHING

## 2019-10-07 ASSESSMENT — PAIN DESCRIPTION - FREQUENCY: FREQUENCY: CONTINUOUS

## 2019-10-07 ASSESSMENT — PAIN DESCRIPTION - ORIENTATION: ORIENTATION: LOWER

## 2019-10-07 ASSESSMENT — PAIN DESCRIPTION - LOCATION: LOCATION: BACK

## 2019-10-10 ENCOUNTER — OFFICE VISIT (OUTPATIENT)
Dept: OBGYN | Age: 34
End: 2019-10-10
Payer: COMMERCIAL

## 2019-10-10 ENCOUNTER — HOSPITAL ENCOUNTER (OUTPATIENT)
Age: 34
Setting detail: SPECIMEN
Discharge: HOME | End: 2019-10-10
Payer: COMMERCIAL

## 2019-10-10 VITALS
SYSTOLIC BLOOD PRESSURE: 126 MMHG | HEIGHT: 62 IN | WEIGHT: 147 LBS | BODY MASS INDEX: 27.05 KG/M2 | DIASTOLIC BLOOD PRESSURE: 80 MMHG

## 2019-10-10 DIAGNOSIS — R10.2 PELVIC PAIN: Primary | ICD-10-CM

## 2019-10-10 DIAGNOSIS — N92.6 IRREGULAR MENSES: ICD-10-CM

## 2019-10-10 DIAGNOSIS — R10.2 PELVIC PAIN: ICD-10-CM

## 2019-10-10 PROCEDURE — 87070 CULTURE OTHR SPECIMN AEROBIC: CPT

## 2019-10-10 PROCEDURE — G8427 DOCREV CUR MEDS BY ELIG CLIN: HCPCS | Performed by: ADVANCED PRACTICE MIDWIFE

## 2019-10-10 PROCEDURE — G8417 CALC BMI ABV UP PARAM F/U: HCPCS | Performed by: ADVANCED PRACTICE MIDWIFE

## 2019-10-10 PROCEDURE — 76856 US EXAM PELVIC COMPLETE: CPT | Performed by: OBSTETRICS & GYNECOLOGY

## 2019-10-10 PROCEDURE — 87491 CHLMYD TRACH DNA AMP PROBE: CPT

## 2019-10-10 PROCEDURE — G8484 FLU IMMUNIZE NO ADMIN: HCPCS | Performed by: ADVANCED PRACTICE MIDWIFE

## 2019-10-10 PROCEDURE — 99213 OFFICE O/P EST LOW 20 MIN: CPT | Performed by: ADVANCED PRACTICE MIDWIFE

## 2019-10-10 PROCEDURE — 87591 N.GONORRHOEAE DNA AMP PROB: CPT

## 2019-10-10 PROCEDURE — 1036F TOBACCO NON-USER: CPT | Performed by: ADVANCED PRACTICE MIDWIFE

## 2019-10-10 RX ORDER — NORGESTIMATE AND ETHINYL ESTRADIOL 0.25-0.035
1 KIT ORAL DAILY
Qty: 1 PACKET | Refills: 12 | Status: SHIPPED | OUTPATIENT
Start: 2019-10-10 | End: 2021-03-17

## 2019-10-10 ASSESSMENT — PATIENT HEALTH QUESTIONNAIRE - PHQ9
SUM OF ALL RESPONSES TO PHQ QUESTIONS 1-9: 0
1. LITTLE INTEREST OR PLEASURE IN DOING THINGS: 0
SUM OF ALL RESPONSES TO PHQ9 QUESTIONS 1 & 2: 0
SUM OF ALL RESPONSES TO PHQ QUESTIONS 1-9: 0
2. FEELING DOWN, DEPRESSED OR HOPELESS: 0

## 2019-10-13 LAB
CULTURE: NORMAL
Lab: NORMAL
SPECIMEN DESCRIPTION: NORMAL

## 2019-10-14 LAB
C TRACH DNA GENITAL QL NAA+PROBE: NEGATIVE
N. GONORRHOEAE DNA: NEGATIVE
SPECIMEN DESCRIPTION: NORMAL

## 2020-06-25 NOTE — TELEPHONE ENCOUNTER
I sent the referral, office note, ultrasound report and insurance card to [facility] in [facility]. [facility] confirmed she got all the above information and is working on scheduling her procedure; I just called Chester Isaac to give her an update that I sent all the information to [facility] and [facility] would be in contact with her going forward. Medication(s) to Refill:   Requested Prescriptions     Pending Prescriptions Disp Refills   • ALPRAZOLAM 0.5 MG Oral Tab [Pharmacy Med Name: Alprazolam 0.5 Mg Tab Acta] 60 tablet 0     Sig: TAKE ONE TABLET BY MOUTH TWICE DAILY AS NEEDED FOR SLEEP         R

## 2020-07-29 ENCOUNTER — HOSPITAL ENCOUNTER (OUTPATIENT)
Dept: LAB | Age: 35
Setting detail: SPECIMEN
Discharge: HOME | End: 2020-07-29
Payer: COMMERCIAL

## 2020-07-29 PROCEDURE — U0003 INFECTIOUS AGENT DETECTION BY NUCLEIC ACID (DNA OR RNA); SEVERE ACUTE RESPIRATORY SYNDROME CORONAVIRUS 2 (SARS-COV-2) (CORONAVIRUS DISEASE [COVID-19]), AMPLIFIED PROBE TECHNIQUE, MAKING USE OF HIGH THROUGHPUT TECHNOLOGIES AS DESCRIBED BY CMS-2020-01-R: HCPCS

## 2020-07-31 LAB — SARS-COV-2, NAA: NOT DETECTED

## 2020-09-24 ENCOUNTER — OFFICE VISIT (OUTPATIENT)
Dept: OBGYN | Age: 35
End: 2020-09-24
Payer: COMMERCIAL

## 2020-09-24 VITALS
WEIGHT: 151 LBS | HEIGHT: 62 IN | SYSTOLIC BLOOD PRESSURE: 126 MMHG | BODY MASS INDEX: 27.79 KG/M2 | DIASTOLIC BLOOD PRESSURE: 70 MMHG

## 2020-09-24 PROCEDURE — 99213 OFFICE O/P EST LOW 20 MIN: CPT | Performed by: ADVANCED PRACTICE MIDWIFE

## 2020-09-24 PROCEDURE — G8427 DOCREV CUR MEDS BY ELIG CLIN: HCPCS | Performed by: ADVANCED PRACTICE MIDWIFE

## 2020-09-24 PROCEDURE — 1036F TOBACCO NON-USER: CPT | Performed by: ADVANCED PRACTICE MIDWIFE

## 2020-09-24 PROCEDURE — G8417 CALC BMI ABV UP PARAM F/U: HCPCS | Performed by: ADVANCED PRACTICE MIDWIFE

## 2020-09-24 ASSESSMENT — PATIENT HEALTH QUESTIONNAIRE - PHQ9
2. FEELING DOWN, DEPRESSED OR HOPELESS: 0
1. LITTLE INTEREST OR PLEASURE IN DOING THINGS: 0
SUM OF ALL RESPONSES TO PHQ9 QUESTIONS 1 & 2: 0
SUM OF ALL RESPONSES TO PHQ QUESTIONS 1-9: 0
SUM OF ALL RESPONSES TO PHQ QUESTIONS 1-9: 0

## 2020-09-24 NOTE — PROGRESS NOTES
PROBLEM VISIT     Date of service: 2020    Roxi Chavez  Is a 29 y.o.  female    PT's PCP is: Nargis Carter MD     : 1985                                             Subjective:       Patient's last menstrual period was 2020 (approximate). OB History    Para Term  AB Living   3 2 2   1 2   SAB TAB Ectopic Molar Multiple Live Births           0 2      # Outcome Date GA Lbr Doug/2nd Weight Sex Delivery Anes PTL Lv   3 Term 19 39w1d 07:53 / 00:20 8 lb 7.9 oz (3.854 kg) M Vag-Spont EPI N LOUISA   2 AB 18 10w4d          1 Term 08/21/10 39w2d  7 lb 10 oz (3.459 kg) F Vag-Spont None N LOUISA        Social History     Tobacco Use   Smoking Status Former Smoker    Packs/day: 0.25   Smokeless Tobacco Never Used   Tobacco Comment    1 year ago        Social History     Substance and Sexual Activity   Alcohol Use Yes    Alcohol/week: 0.0 standard drinks    Comment: occationally       Allergies: Pcn [penicillins]; Percocet [oxycodone-acetaminophen]; and Vicodin [hydrocodone-acetaminophen]      Current Outpatient Medications:     loratadine (CLARITIN) 10 MG tablet, Take 1 tablet by mouth daily as needed (congestion), Disp: 30 tablet, Rfl: 0    acetaminophen (TYLENOL) 500 MG tablet, Take 2 tablets by mouth every 6 hours as needed for Pain or Fever Patient is 24 weeks pregnant, Disp: 30 tablet, Rfl: 0    norgestimate-ethinyl estradiol (ORTHO-CYCLEN, 28,) 0.25-35 MG-MCG per tablet, Take 1 tablet by mouth daily, Disp: 1 packet, Rfl: 12    fluticasone (FLONASE) 50 MCG/ACT nasal spray, 2 sprays by Each Nare route daily, Disp: 1 Bottle, Rfl: 0    Social History     Substance and Sexual Activity   Sexual Activity Yes    Partners: Male    Birth control/protection: Condom       Last Yearly:  2019    Last pap: 2019    Last HPV: 2019    Chief Complaint   Patient presents with    Breast Problem     C/O right breast tenderness and small lump.          PE:  Vital Signs  Blood pressure 126/70, height 5' 2\" (1.575 m), weight 151 lb (68.5 kg), last menstrual period 08/27/2020, not currently breastfeeding. Estimated body mass index is 27.62 kg/m² as calculated from the following:    Height as of this encounter: 5' 2\" (1.575 m). Weight as of this encounter: 151 lb (68.5 kg). NURSE: Odette NEGRETE    HPI: here for right breast tenderness     PT denies fever, chills, nausea and vomiting       Objective   No acute distress  Excellent communications  Well-nourished  Lymphatic:   no lymphadenopathy     Breasts: Breast:right breast with generalized tenderness and  dense right breast around 9 oclock, tender                        Results reviewed today:    No results found for this visit on 09/24/20. Assessment and Plan          Diagnosis Orders   1. Breast pain, right  US Breast Limited Right    [facility]IDER DIAGNOSTIC W OR WO CAD BILATERAL   2. Lump of right breast  US Breast Limited Right    HAIDER DIAGNOSTIC W OR WO CAD BILATERAL       await results      [facility] maintain her acetaminophen, fluticasone, loratadine, and norgestimate-ethinyl estradiol. No follow-ups on file. There are no Patient Instructions on file for this visit. Over 50% of time spent on counseling and care coordination on: see assessment and plan,  She was also counseled on her preventative health maintenance recommendations and follow-up.         FF time: 15 min      Lisa Diaz,9/24/2020 4:20 PM

## 2020-10-09 ENCOUNTER — HOSPITAL ENCOUNTER (OUTPATIENT)
Dept: ULTRASOUND IMAGING | Age: 35
Discharge: HOME | End: 2020-10-11
Payer: COMMERCIAL

## 2020-10-09 ENCOUNTER — HOSPITAL ENCOUNTER (OUTPATIENT)
Dept: WOMENS IMAGING | Age: 35
Discharge: HOME | End: 2020-10-11
Payer: COMMERCIAL

## 2020-10-09 PROCEDURE — 76641 ULTRASOUND BREAST COMPLETE: CPT

## 2020-10-09 PROCEDURE — G0279 TOMOSYNTHESIS, MAMMO: HCPCS

## 2020-10-12 NOTE — RESULT ENCOUNTER NOTE
Pt. notified of results and voices understanding. Patient has yearly scheduled 10/22/2020, will have breast exam  Then and schedule further follow up at that time.

## 2021-03-17 ENCOUNTER — OFFICE VISIT (OUTPATIENT)
Dept: PRIMARY CARE CLINIC | Age: 36
End: 2021-03-17
Payer: COMMERCIAL

## 2021-03-17 VITALS
RESPIRATION RATE: 18 BRPM | OXYGEN SATURATION: 98 % | WEIGHT: 145 LBS | DIASTOLIC BLOOD PRESSURE: 82 MMHG | HEART RATE: 69 BPM | TEMPERATURE: 98.8 F | SYSTOLIC BLOOD PRESSURE: 122 MMHG | BODY MASS INDEX: 26.68 KG/M2 | HEIGHT: 62 IN

## 2021-03-17 DIAGNOSIS — H66.001 NON-RECURRENT ACUTE SUPPURATIVE OTITIS MEDIA OF RIGHT EAR WITHOUT SPONTANEOUS RUPTURE OF TYMPANIC MEMBRANE: Primary | ICD-10-CM

## 2021-03-17 PROCEDURE — G8417 CALC BMI ABV UP PARAM F/U: HCPCS | Performed by: NURSE PRACTITIONER

## 2021-03-17 PROCEDURE — 1036F TOBACCO NON-USER: CPT | Performed by: NURSE PRACTITIONER

## 2021-03-17 PROCEDURE — G8427 DOCREV CUR MEDS BY ELIG CLIN: HCPCS | Performed by: NURSE PRACTITIONER

## 2021-03-17 PROCEDURE — 99213 OFFICE O/P EST LOW 20 MIN: CPT | Performed by: NURSE PRACTITIONER

## 2021-03-17 PROCEDURE — G8484 FLU IMMUNIZE NO ADMIN: HCPCS | Performed by: NURSE PRACTITIONER

## 2021-03-17 RX ORDER — DOXYCYCLINE HYCLATE 100 MG
100 TABLET ORAL 2 TIMES DAILY
Qty: 20 TABLET | Refills: 0 | Status: SHIPPED | OUTPATIENT
Start: 2021-03-17 | End: 2021-03-27

## 2021-03-17 ASSESSMENT — ENCOUNTER SYMPTOMS
SINUS PRESSURE: 1
SORE THROAT: 0
DIARRHEA: 0
COUGH: 0
SHORTNESS OF BREATH: 0
ABDOMINAL PAIN: 0
WHEEZING: 0
TROUBLE SWALLOWING: 0
RHINORRHEA: 1

## 2021-03-17 NOTE — PROGRESS NOTES
[facility]  [address] [facility] [address] [facility]  Dept: [phone]  Dept Fax: [phone]    Bruce Castillo is a 28 y.o. female who [facility]sto the [facility] today for her medical conditions/complaints as noted below. Bruce Castillo is c/o of Otalgia (x 1 week. c/o pain, fullness, congestion)      HPI:     Berna Brown is here today for a walk-in visit. She reports she has had ear pain over the last 5 days. She has had some on and off congestion. Reports that she tested positive for COVID-19 a little over a month ago. See below for further detail. Otalgia   There is pain in the right ear. This is a new problem. The current episode started in the past 7 days. The problem has been gradually worsening. There has been no fever. The pain is moderate. Associated symptoms include rhinorrhea. Pertinent negatives include no abdominal pain, coughing, diarrhea, ear discharge or sore throat. She has tried heat packs for the symptoms. The treatment provided mild relief.        Past Medical History:   Diagnosis Date    Abnormal Pap smear of cervix     Repeat pap yearly    Migraine     Recurrent UTI         Current Outpatient Medications   Medication Sig Dispense Refill    doxycycline hyclate (VIBRA-TABS) 100 MG tablet Take 1 tablet by mouth 2 times daily for 10 days 20 tablet 0    norgestimate-ethinyl estradiol (ORTHO-CYCLEN, 28,) 0.25-35 MG-MCG per tablet Take 1 tablet by mouth daily 1 packet 12    fluticasone (FLONASE) 50 MCG/ACT nasal spray 2 sprays by Each Nare route daily 1 Bottle 0    loratadine (CLARITIN) 10 MG tablet Take 1 tablet by mouth daily as needed (congestion) (Patient not taking: Reported on 3/17/2021) 30 tablet 0    acetaminophen (TYLENOL) 500 MG tablet Take 2 tablets by mouth every 6 hours as needed for Pain or Fever Patient is 24 weeks pregnant (Patient not taking: Reported on 3/17/2021) 30 tablet 0     No current facility-administered medications for this visit. Allergies   Allergen Reactions    Pcn [Penicillins]     Percocet [Oxycodone-Acetaminophen] Nausea And Vomiting    Vicodin [Hydrocodone-Acetaminophen] Nausea And Vomiting       Subjective:      Review of Systems   Constitutional: Negative for activity change, fatigue and fever. HENT: Positive for congestion, ear pain, postnasal drip, rhinorrhea and sinus pressure. Negative for ear discharge, sore throat and trouble swallowing. Respiratory: Negative for cough, shortness of breath and wheezing. Gastrointestinal: Negative for abdominal pain and diarrhea. Objective:     Physical Exam  Vitals signs and nursing note reviewed. Constitutional:       General: She is not in acute distress. Appearance: She is not toxic-appearing or diaphoretic. HENT:      Head: Normocephalic and atraumatic. Right Ear: Tenderness present. A middle ear effusion is present. Tympanic membrane is erythematous. Tympanic membrane is not bulging. Left Ear: No tenderness. No middle ear effusion. Tympanic membrane is not erythematous or bulging. Ears:      Comments: Right TM dull     Nose: Congestion present. No rhinorrhea. Mouth/Throat:      Pharynx: No oropharyngeal exudate or posterior oropharyngeal erythema. Eyes:      General:         Right eye: No discharge. Left eye: No discharge. Conjunctiva/sclera: Conjunctivae normal.   Neck:      Musculoskeletal: Normal range of motion and neck supple. Cardiovascular:      Rate and Rhythm: Normal rate and regular rhythm. Heart sounds: No murmur. Pulmonary:      Effort: Pulmonary effort is normal.      Breath sounds: Normal breath sounds. No wheezing, rhonchi or rales. Lymphadenopathy:      Cervical: Cervical adenopathy present. Neurological:      General: No focal deficit present. Mental Status: She is alert and oriented to person, place, and time.    Psychiatric:         Mood

## 2021-03-17 NOTE — PATIENT INSTRUCTIONS
SURVEY:    You may be receiving a survey from [employer] regarding your visit today. Please complete the survey to enable us to provide the highest quality of care to you and your family. If you cannot score us a very good on any question, please call the office to discuss how we could have made your experience a very good one. Thank you.

## 2021-03-23 ENCOUNTER — TELEPHONE (OUTPATIENT)
Dept: OBGYN | Age: 36
End: 2021-03-23

## 2021-03-23 NOTE — TELEPHONE ENCOUNTER
Patient calls with concerns. Has appointment for yearly end of April, however patient has concerns with heavy periods and fatigue. Also right breast bothers her especially close to period. Patient asking about having labs checked , CBC and hormones?

## 2021-03-24 DIAGNOSIS — N93.9 EPISODE OF HEAVY VAGINAL BLEEDING: Primary | ICD-10-CM

## 2021-04-09 ENCOUNTER — HOSPITAL ENCOUNTER (OUTPATIENT)
Age: 36
Discharge: HOME | End: 2021-04-09
Payer: COMMERCIAL

## 2021-04-09 DIAGNOSIS — N93.9 EPISODE OF HEAVY VAGINAL BLEEDING: ICD-10-CM

## 2021-04-09 LAB
ABSOLUTE EOS #: 0.08 K/UL (ref 0–0.44)
ABSOLUTE IMMATURE GRANULOCYTE: 0.03 K/UL (ref 0–0.3)
ABSOLUTE LYMPH #: 2.14 K/UL (ref 1.1–3.7)
ABSOLUTE MONO #: 0.54 K/UL (ref 0.1–1.2)
BASOPHILS # BLD: 1 % (ref 0–2)
BASOPHILS ABSOLUTE: 0.04 K/UL (ref 0–0.2)
DIFFERENTIAL TYPE: NORMAL
EOSINOPHILS RELATIVE PERCENT: 1 % (ref 1–4)
HCT VFR BLD CALC: 37.8 % (ref 36.3–47.1)
HEMOGLOBIN: 12.4 G/DL (ref 11.9–15.1)
IMMATURE GRANULOCYTES: 0 %
LYMPHOCYTES # BLD: 29 % (ref 24–43)
MCH RBC QN AUTO: 30.2 PG (ref 25.2–33.5)
MCHC RBC AUTO-ENTMCNC: 32.8 G/DL (ref 28.4–34.8)
MCV RBC AUTO: 92.2 FL (ref 82.6–102.9)
MONOCYTES # BLD: 7 % (ref 3–12)
NRBC AUTOMATED: 0 PER 100 WBC
PDW BLD-RTO: 11.9 % (ref 11.8–14.4)
PLATELET # BLD: 231 K/UL (ref 138–453)
PLATELET ESTIMATE: NORMAL
PMV BLD AUTO: 10.7 FL (ref 8.1–13.5)
RBC # BLD: 4.1 M/UL (ref 3.95–5.11)
RBC # BLD: NORMAL 10*6/UL
SEG NEUTROPHILS: 62 % (ref 36–65)
SEGMENTED NEUTROPHILS ABSOLUTE COUNT: 4.57 K/UL (ref 1.5–8.1)
WBC # BLD: 7.4 K/UL (ref 3.5–11.3)
WBC # BLD: NORMAL 10*3/UL

## 2021-04-09 PROCEDURE — 36415 COLL VENOUS BLD VENIPUNCTURE: CPT

## 2021-04-09 PROCEDURE — 85025 COMPLETE CBC W/AUTO DIFF WBC: CPT

## 2021-04-14 ENCOUNTER — OFFICE VISIT (OUTPATIENT)
Dept: OBGYN | Age: 36
End: 2021-04-14
Payer: COMMERCIAL

## 2021-04-14 VITALS
HEIGHT: 62 IN | WEIGHT: 141.2 LBS | SYSTOLIC BLOOD PRESSURE: 138 MMHG | DIASTOLIC BLOOD PRESSURE: 80 MMHG | BODY MASS INDEX: 25.98 KG/M2

## 2021-04-14 DIAGNOSIS — R10.2 PELVIC PAIN: Primary | ICD-10-CM

## 2021-04-14 PROCEDURE — 99213 OFFICE O/P EST LOW 20 MIN: CPT | Performed by: ADVANCED PRACTICE MIDWIFE

## 2021-04-14 PROCEDURE — G8417 CALC BMI ABV UP PARAM F/U: HCPCS | Performed by: ADVANCED PRACTICE MIDWIFE

## 2021-04-14 PROCEDURE — 1036F TOBACCO NON-USER: CPT | Performed by: ADVANCED PRACTICE MIDWIFE

## 2021-04-14 PROCEDURE — G8427 DOCREV CUR MEDS BY ELIG CLIN: HCPCS | Performed by: ADVANCED PRACTICE MIDWIFE

## 2021-04-14 NOTE — PROGRESS NOTES
not currently breastfeeding. Estimated body mass index is 25.83 kg/m² as calculated from the following:    Height as of this encounter: 5' 2\" (1.575 m). Weight as of this encounter: 141 lb 3.2 oz (64 kg). No data recorded    NURSE: Odette NEGRETE    HPI: here for f/u previous ovarian cysts and RLQ tenderness     PT denies fever, chills, nausea and vomiting       Objective   Has pictures of pruritic painful right anterior rib cage rash, appears herpetic, so likely shingles - now resolved      GI: Abdomen soft, non-tender. BS normal. No masses,  No organomegaly           Extremities: normal strength, tone, and muscle mass      Pelvic Exam: GENITAL/URINARY:  External Genitalia:  General appearance; normal, Hair distribution; normal, Lesions absent  Urethral Meatus:  Size normal, Location normal, Lesions absent, Prolapse absent  Urethra: Fullness absent, Masses absent  Bladder:  Fullness absent, Masses absent, Tenderness absent, Cystocele absent  Vagina:  General appearance normal, Estrogen effect normal, Discharge absent, Lesions absent, Pelvic support normal  Cervix:  General appearance normal, Lesions absent, Discharge absent, Tenderness absent, Enlargement absent, Nodularity absent  Uterus:  Size normal, Tenderness absent, retroverted  Adenexa: Masses absent, Tenderness absent, right ovary sits on top/ anterior to uterus  Anus/Perineum:  Lesions absent and Masses absent                                                       Results reviewed today:  Sono:  GYN Ultrasound     Uterus measures: 8.2 x 5.3  x 4.4 cm  Endometrium measures: 6.0 mm  Dominant follicle on the right ovary vianey: 1.8 x 1.4 x 1.9 cm  Left ovary appears WNL  No results found for this visit on 04/14/21. Assessment and Plan          Diagnosis Orders   1. Pelvic pain         reviewed danger signs, patient is also to note if pain is associated with anything bowel or bladder or activity.       I am having Lorette Dakin maintain her

## 2021-04-29 ENCOUNTER — OFFICE VISIT (OUTPATIENT)
Dept: OBGYN | Age: 36
End: 2021-04-29
Payer: COMMERCIAL

## 2021-04-29 VITALS
DIASTOLIC BLOOD PRESSURE: 86 MMHG | SYSTOLIC BLOOD PRESSURE: 138 MMHG | BODY MASS INDEX: 25.58 KG/M2 | WEIGHT: 139 LBS | HEIGHT: 62 IN

## 2021-04-29 DIAGNOSIS — Z01.419 ENCOUNTER FOR ANNUAL ROUTINE GYNECOLOGICAL EXAMINATION: Primary | ICD-10-CM

## 2021-04-29 DIAGNOSIS — N60.11 BILATERAL FIBROCYSTIC BREAST CHANGES: ICD-10-CM

## 2021-04-29 DIAGNOSIS — N60.12 BILATERAL FIBROCYSTIC BREAST CHANGES: ICD-10-CM

## 2021-04-29 PROCEDURE — 99395 PREV VISIT EST AGE 18-39: CPT | Performed by: ADVANCED PRACTICE MIDWIFE

## 2021-04-29 NOTE — PROGRESS NOTES
YEARLY PHYSICAL    Date of service: 2021    Shanthi Pires  Is a 28 y.o.   female    PT's PCP is: Hazel Toledo MD     : 1985                                             Subjective:       Patient's last menstrual period was 2021 (exact date).      Are your menses regular: yes    OB History    Para Term  AB Living   3 2 2   1 2   SAB TAB Ectopic Molar Multiple Live Births           0 2      # Outcome Date GA Lbr Doug/2nd Weight Sex Delivery Anes PTL Lv   3 Term 19 39w1d 07:53 / 00:20 8 lb 7.9 oz (3.854 kg) M Vag-Spont EPI N LOUISA   2 AB 18 10w4d          1 Term 08/21/10 39w2d  7 lb 10 oz (3.459 kg) F Vag-Spont None N LOUISA        Social History     Tobacco Use   Smoking Status Former Smoker    Packs/day: 0.25   Smokeless Tobacco Never Used   Tobacco Comment    1 year ago        Social History     Substance and Sexual Activity   Alcohol Use Yes    Alcohol/week: 0.0 standard drinks    Comment: occationally       Family History   Problem Relation Age of Onset    Hypertension Mother     Hypertension Father     Asthma Brother     No Known Problems Maternal Grandmother     Heart Disease Maternal Grandfather     Other Paternal Grandmother         High Cholesterol    Other Paternal Grandfather         High Cholesterol    Other Other         No family h/o breast cancer or stroke       Any family history of breast or ovarian cancer: No    Any family history of blood clots: No    Have you had a positive covid test: No    Have you had the covid immunization: No      Allergies: Pcn [penicillins], Percocet [oxycodone-acetaminophen], and Vicodin [hydrocodone-acetaminophen]      Current Outpatient Medications:     loratadine (CLARITIN) 10 MG tablet, Take 1 tablet by mouth daily as needed (congestion) (Patient not taking: Reported on 3/17/2021), Disp: 30 tablet, Rfl: 0    acetaminophen (TYLENOL) 500 MG tablet, Take 2 tablets by mouth every 6 hours as needed for Pain or Fever Patient is 24 weeks pregnant (Patient not taking: Reported on 3/17/2021), Disp: 30 tablet, Rfl: 0    Social History     Substance and Sexual Activity   Sexual Activity Yes    Partners: Male    Birth control/protection: Condom       Any bleeding or pain with intercourse: No    Last Yearly:  2019    Last pap: 2019    Last HPV: 2019    Last Mammogram: 10/2020    Last Cassia Khat never    Last colorectal screen- type:never*  date  never    Do you do self breast exams: Yes    Past Medical History:   Diagnosis Date    Abnormal Pap smear of cervix     Repeat pap yearly    Migraine     Recurrent UTI        Past Surgical History:   Procedure Laterality Date    CHOLECYSTECTOMY      DILATION AND CURETTAGE  09/21/2018    KNEE SURGERY Left     KNEE SURGERY Left     TONSILLECTOMY         Family History   Problem Relation Age of Onset    Hypertension Mother     Hypertension Father     Asthma Brother     No Known Problems Maternal Grandmother     Heart Disease Maternal Grandfather     Other Paternal Grandmother         High Cholesterol    Other Paternal Grandfather         High Cholesterol    Other Other         No family h/o breast cancer or stroke       Chief Complaint   Patient presents with    Gynecologic Exam     Yearly exam. Last pap 01/2019 negative, HPV not detected. PE:  Vital Signs  Blood pressure 138/86, height 5' 2\" (1.575 m), weight 139 lb (63 kg), last menstrual period 04/01/2021, not currently breastfeeding. Estimated body mass index is 25.42 kg/m² as calculated from the following:    Height as of this encounter: 5' 2\" (1.575 m). Weight as of this encounter: 139 lb (63 kg). Labs:    No results found for this visit on 04/29/21.     No data recorded    NURSE: Odette NEGRETE    HPI: here for annual gyn exam, has hx of cystics breast changes; recent pelvic tenderness now resolved    Review of Systems Constitutional: Negative. HENT: Negative for congestion. Respiratory: Negative for shortness of breath. Cardiovascular: Negative for chest pain. Gastrointestinal: Negative for abdominal pain. Genitourinary: Negative for dysuria. Musculoskeletal: Negative for back pain. Skin: Negative for rash. Neurological: Negative for headaches. Psychiatric/Behavioral: The patient is not nervous/anxious. Objective  Lymphatic:   no lymphadenopathy  Heent:   negative   Cor: regular rate and rhythm, no murmurs              Pul:clear to auscultation bilaterally- no wheezes, rales or rhonchi, normal air movement, no respiratory distress      GI: Abdomen soft, non-tender. BS normal. No masses,  No organomegaly           Extremities: normal strength, tone, and muscle mass   Breasts: Breast:normal appearance, no masses or tenderness   Pelvic Exam: GENITAL/URINARY:  External Genitalia:  General appearance; normal, Hair distribution; normal, Lesions absent  Urethral Meatus:  Size normal, Location normal, Lesions absent, Prolapse absent  Urethra: Fullness absent, Masses absent  Bladder:  Fullness absent, Masses absent, Tenderness absent, Cystocele absent  Vagina:  General appearance normal, Estrogen effect normal, Discharge absent, Lesions absent, Pelvic support normal  Cervix:  General appearance normal, Lesions absent, Discharge absent, Tenderness absent, Enlargement absent, Nodularity absent  Uterus:  Size normal, Tenderness absent  Adenexa: Masses absent, Tenderness absent  Anus/Perineum:  Lesions absent and Masses absent                                                               Assessment and Plan          Diagnosis Orders   1. Encounter for annual routine gynecological examination     2. Bilateral fibrocystic breast changes  HAIDER DARLENE DIGITAL DIAGNOSTIC BILATERAL    US BREAST LIMITED RIGHT    US BREAST LIMITED LEFT             I am having Baljinder Boateng maintain her acetaminophen and loratadine. No follow-ups on file. She was also counseled on her preventative health maintenance recommendations and follow-up. There are no Patient Instructions on file for this visit.     Jeromy Diaz,4/29/2021 1:53 PM

## 2021-04-30 ASSESSMENT — ENCOUNTER SYMPTOMS
SHORTNESS OF BREATH: 0
BACK PAIN: 0
ABDOMINAL PAIN: 0

## 2021-05-07 ENCOUNTER — HOSPITAL ENCOUNTER (OUTPATIENT)
Dept: ULTRASOUND IMAGING | Age: 36
Discharge: HOME | End: 2021-05-09
Payer: COMMERCIAL

## 2021-05-07 ENCOUNTER — HOSPITAL ENCOUNTER (OUTPATIENT)
Dept: WOMENS IMAGING | Age: 36
Discharge: HOME | End: 2021-05-09
Payer: COMMERCIAL

## 2021-05-07 DIAGNOSIS — N60.11 BILATERAL FIBROCYSTIC BREAST CHANGES: ICD-10-CM

## 2021-05-07 DIAGNOSIS — N60.12 BILATERAL FIBROCYSTIC BREAST CHANGES: ICD-10-CM

## 2021-05-07 PROCEDURE — G0279 TOMOSYNTHESIS, MAMMO: HCPCS

## 2021-05-07 PROCEDURE — 76642 ULTRASOUND BREAST LIMITED: CPT

## 2021-08-18 ENCOUNTER — OFFICE VISIT (OUTPATIENT)
Dept: OBGYN | Age: 36
End: 2021-08-18
Payer: COMMERCIAL

## 2021-08-18 VITALS
HEIGHT: 62 IN | DIASTOLIC BLOOD PRESSURE: 84 MMHG | BODY MASS INDEX: 26.46 KG/M2 | SYSTOLIC BLOOD PRESSURE: 128 MMHG | WEIGHT: 143.8 LBS

## 2021-08-18 DIAGNOSIS — N64.4 MASTODYNIA OF LEFT BREAST: Primary | ICD-10-CM

## 2021-08-18 PROCEDURE — 1036F TOBACCO NON-USER: CPT | Performed by: ADVANCED PRACTICE MIDWIFE

## 2021-08-18 PROCEDURE — 99213 OFFICE O/P EST LOW 20 MIN: CPT | Performed by: ADVANCED PRACTICE MIDWIFE

## 2021-08-18 PROCEDURE — G8417 CALC BMI ABV UP PARAM F/U: HCPCS | Performed by: ADVANCED PRACTICE MIDWIFE

## 2021-08-18 PROCEDURE — G8427 DOCREV CUR MEDS BY ELIG CLIN: HCPCS | Performed by: ADVANCED PRACTICE MIDWIFE

## 2021-08-18 ASSESSMENT — PATIENT HEALTH QUESTIONNAIRE - PHQ9
2. FEELING DOWN, DEPRESSED OR HOPELESS: 0
SUM OF ALL RESPONSES TO PHQ QUESTIONS 1-9: 0
SUM OF ALL RESPONSES TO PHQ QUESTIONS 1-9: 0
SUM OF ALL RESPONSES TO PHQ9 QUESTIONS 1 & 2: 0
1. LITTLE INTEREST OR PLEASURE IN DOING THINGS: 0
SUM OF ALL RESPONSES TO PHQ QUESTIONS 1-9: 0

## 2021-08-18 NOTE — PROGRESS NOTES
PROBLEM VISIT     Date of service: 2021    Ellie Bravo  Is a 28 y.o.  female    PT's PCP is: Tammy Oquendo MD     : 1985                                             Subjective:       Patient's last menstrual period was 2021 (approximate). OB History    Para Term  AB Living   3 2 2   1 2   SAB TAB Ectopic Molar Multiple Live Births           0 2      # Outcome Date GA Lbr Doug/2nd Weight Sex Delivery Anes PTL Lv   3 Term 19 39w1d 07:53 / 00:20 8 lb 7.9 oz (3.854 kg) M Vag-Spont EPI N LOUISA   2 AB 18 10w4d          1 Term 08/21/10 39w2d  7 lb 10 oz (3.459 kg) F Vag-Spont None N LOUISA        Social History     Tobacco Use   Smoking Status Former Smoker    Packs/day: 0.25   Smokeless Tobacco Never Used   Tobacco Comment    1 year ago        Social History     Substance and Sexual Activity   Alcohol Use Yes    Alcohol/week: 0.0 standard drinks    Comment: occationally       Allergies: Pcn [penicillins], Percocet [oxycodone-acetaminophen], and Vicodin [hydrocodone-acetaminophen]      Current Outpatient Medications:     loratadine (CLARITIN) 10 MG tablet, Take 1 tablet by mouth daily as needed (congestion) (Patient not taking: Reported on 3/17/2021), Disp: 30 tablet, Rfl: 0    acetaminophen (TYLENOL) 500 MG tablet, Take 2 tablets by mouth every 6 hours as needed for Pain or Fever Patient is 24 weeks pregnant (Patient not taking: Reported on 3/17/2021), Disp: 30 tablet, Rfl: 0    Social History     Substance and Sexual Activity   Sexual Activity Yes    Partners: Male    Birth control/protection: Condom       Last Yearly:  2021    Last pap: 2019    Last HPV: 2019    Have you had a positive covid test: Yes    Have you had the covid immunization: Yes    Chief Complaint   Patient presents with    Breast Problem     Breast exam. H/O left breast lump, patient had imaging 2020.          PE:  Vital Signs  Blood pressure 128/84, height 5' 2\"

## 2022-07-07 ENCOUNTER — APPOINTMENT (OUTPATIENT)
Dept: GENERAL RADIOLOGY | Age: 37
End: 2022-07-07
Payer: COMMERCIAL

## 2022-07-07 ENCOUNTER — HOSPITAL ENCOUNTER (EMERGENCY)
Age: 37
Discharge: HOME | End: 2022-07-07
Payer: COMMERCIAL

## 2022-07-07 VITALS
SYSTOLIC BLOOD PRESSURE: 169 MMHG | DIASTOLIC BLOOD PRESSURE: 89 MMHG | OXYGEN SATURATION: 99 % | HEART RATE: 71 BPM | RESPIRATION RATE: 16 BRPM | TEMPERATURE: 97 F

## 2022-07-07 DIAGNOSIS — M65.9 TENOSYNOVITIS OF LEFT WRIST: Primary | ICD-10-CM

## 2022-07-07 PROCEDURE — 99284 EMERGENCY DEPT VISIT MOD MDM: CPT

## 2022-07-07 PROCEDURE — 6360000002 HC RX W HCPCS: Performed by: PHYSICIAN ASSISTANT

## 2022-07-07 PROCEDURE — 96372 THER/PROPH/DIAG INJ SC/IM: CPT

## 2022-07-07 PROCEDURE — 6370000000 HC RX 637 (ALT 250 FOR IP): Performed by: PHYSICIAN ASSISTANT

## 2022-07-07 PROCEDURE — 73110 X-RAY EXAM OF WRIST: CPT

## 2022-07-07 RX ORDER — ONDANSETRON 4 MG/1
4 TABLET, ORALLY DISINTEGRATING ORAL ONCE
Status: COMPLETED | OUTPATIENT
Start: 2022-07-07 | End: 2022-07-07

## 2022-07-07 RX ORDER — KETOROLAC TROMETHAMINE 30 MG/ML
60 INJECTION, SOLUTION INTRAMUSCULAR; INTRAVENOUS ONCE
Status: COMPLETED | OUTPATIENT
Start: 2022-07-07 | End: 2022-07-07

## 2022-07-07 RX ORDER — PREDNISONE 50 MG/1
50 TABLET ORAL DAILY
Qty: 5 TABLET | Refills: 0 | Status: SHIPPED | OUTPATIENT
Start: 2022-07-07 | End: 2022-07-12

## 2022-07-07 RX ADMIN — ONDANSETRON 4 MG: 4 TABLET, ORALLY DISINTEGRATING ORAL at 17:41

## 2022-07-07 RX ADMIN — KETOROLAC TROMETHAMINE 60 MG: 30 INJECTION, SOLUTION INTRAMUSCULAR at 17:41

## 2022-07-07 ASSESSMENT — ENCOUNTER SYMPTOMS
EYES NEGATIVE: 1
RESPIRATORY NEGATIVE: 1
GASTROINTESTINAL NEGATIVE: 1

## 2022-07-07 ASSESSMENT — PAIN - FUNCTIONAL ASSESSMENT: PAIN_FUNCTIONAL_ASSESSMENT: 0-10

## 2022-07-07 NOTE — ED PROVIDER NOTES
[facility]  EMERGENCY DEPARTMENT ENCOUNTER      Pt Name: Dayana Ramos  MRN: [id number]  [place] 1985  Date of evaluation: 7/7/2022  Provider: MOSES Nunes PA-C    CHIEF COMPLAINT     Chief Complaint   Patient presents with    Wrist Pain     left wrist pain and swelling no known injury         HISTORY OF PRESENT ILLNESS   (Location/Symptom, Timing/Onset, Context/Setting,Quality, Duration, Modifying Factors, Severity)  Note limiting factors. Dayana Ramos is a40 y.o. female who presents to the emergency department      80-year-old female presents with left wrist pain and tenderness. States pain began earlier today and patient let her son sleep on her wrist last night for several hours. She has increased swelling and tenderness. Increased pain with range of motion. She is right-hand dominant. This is her left wrist.          Nursing Notes werereviewed. REVIEW OF SYSTEMS    (2-9 systems for level 4, 10 or more for level 5)     Review of Systems   Constitutional: Negative. HENT: Negative. Eyes: Negative. Respiratory: Negative. Cardiovascular: Negative. Gastrointestinal: Negative. Endocrine: Negative. Genitourinary: Negative. Musculoskeletal: Positive for joint swelling. Left wrist pain   Skin: Negative. Neurological: Negative. Psychiatric/Behavioral: Negative. All other systems reviewed and are negative. Except as noted above the remainder of the review of systems was reviewed and negative.        PAST MEDICAL HISTORY     Past Medical History:   Diagnosis Date    Abnormal Pap smear of cervix     Repeat pap yearly    Migraine     Recurrent UTI          SURGICALHISTORY       Past Surgical History:   Procedure Laterality Date    CHOLECYSTECTOMY      DILATION AND CURETTAGE  09/21/2018    KNEE SURGERY Left     KNEE SURGERY Left     TONSILLECTOMY           CURRENT MEDICATIONS       Previous Medications    No medications on file ALLERGIES   Pcn [penicillins], Percocet [oxycodone-acetaminophen], and Vicodin [hydrocodone-acetaminophen]    FAMILY HISTORY       Family History   Problem Relation Age of Onset    Hypertension Mother     Hypertension Father     Asthma Brother     No Known Problems Maternal Grandmother     Heart Disease Maternal Grandfather     Other Paternal Grandmother         High Cholesterol    Other Paternal Grandfather         High Cholesterol    Other Other         No family h/o breast cancer or stroke          SOCIAL HISTORY       Social History     Socioeconomic History    Marital status:      Spouse name: None    Number of children: None    Years of education: None    Highest education level: None   Occupational History    None   Tobacco Use    Smoking status: Former Smoker     Packs/day: 0.25    Smokeless tobacco: Never Used    Tobacco comment: 1 year ago   Vaping Use    Vaping Use: Never used   Substance and Sexual Activity    Alcohol use: Yes     Alcohol/week: 0.0 standard drinks     Comment: occationally    Drug use: No    Sexual activity: Yes     Partners: Male     Birth control/protection: Condom   Other Topics Concern    None   Social History Narrative    None     Social Determinants of Health     Financial Resource Strain: Low Risk     Difficulty of Paying Living Expenses: Not hard at all   Food Insecurity: No Food Insecurity    Worried About Running Out of Food in the Last Year: Never true    [address] in the Last Year: Never true   Transportation Needs:     Lack of Transportation (Medical): Not on file    Lack of Transportation (Non-Medical):  Not on file   Physical Activity:     Days of Exercise per Week: Not on file    Minutes of Exercise per Session: Not on file   Stress:     Feeling of Stress : Not on file   Social Connections:     Frequency of Communication with Friends and Family: Not on file    Frequency of Social Gatherings with Friends and Family: Not on file    Attends Gnosticism Services: Not on file    Active Member of Clubs or Organizations: Not on file    Attends Club or Organization Meetings: Not on file    Marital Status: Not on file   Intimate Partner Violence:     Fear of Current or Ex-Partner: Not on file    Emotionally Abused: Not on file    Physically Abused: Not on file    Sexually Abused: Not on file   Housing Stability:     Unable to Pay for Housing in the Last Year: Not on file    Number of [place] in the Last Year: Not on file    Unstable Housing in the Last Year: Not on file       SCREENINGS    [place] Coma Scale  Eye Opening: Spontaneous  Best Verbal Response: Oriented  Best Motor Response: Obeys commands  [place] Coma Scale Score: 15        PHYSICAL EXAM    (up to 7 for level 4, 8 or more for level 5)     ED Triage Vitals [07/07/22 1655]   BP Temp Temp Source Heart Rate Resp SpO2 Height Weight   (!) 169/89 97 °F (36.1 °C) Tympanic 71 16 99 % -- --       Physical Exam  Vitals and nursing note reviewed. Constitutional:       General: She is not in acute distress. Appearance: Normal appearance. She is not ill-appearing or toxic-appearing. HENT:      Head: Normocephalic and atraumatic. Mouth/Throat:      Mouth: Mucous membranes are moist.   Eyes:      Pupils: Pupils are equal, round, and reactive to light. Cardiovascular:      Rate and Rhythm: Normal rate. Pulses: Normal pulses. Pulmonary:      Effort: Pulmonary effort is normal. No respiratory distress. Breath sounds: Normal breath sounds. No stridor. No wheezing, rhonchi or rales. Abdominal:      General: Abdomen is flat. There is no distension. Palpations: There is no mass. Tenderness: There is no abdominal tenderness. There is no right CVA tenderness, left CVA tenderness, guarding or rebound. Hernia: No hernia is present. Musculoskeletal:         General: Tenderness present. Normal range of motion.       Comments: Wrist pain swelling tenderness positive Finkelstein maneuver   Skin:     Capillary Refill: Capillary refill takes less than 2 seconds. Neurological:      General: No focal deficit present. Mental Status: She is alert and oriented to person, place, and time. Psychiatric:         Mood and Affect: Mood normal.         DIAGNOSTIC RESULTS     EKG: All EKG's are interpreted by the Emergency Department Physician who either signs orCo-signs this chart in the absence of a cardiologist.      RADIOLOGY:   Non-plainfilm images such as CT, Ultrasound and MRI are read by the radiologist. Plain radiographic images are visualized and preliminarily interpreted by the emergency physician with the below findings:      Interpretationper the Radiologist below, if available at the time of this note:    XR WRIST LEFT (MIN 3 VIEWS)   Final Result   No acute osseous abnormality. ED BEDSIDE ULTRASOUND:   Performed by ED Physician - none    LABS:  Labs Reviewed - No data to display    All other labs were within normal range or not returned as of this dictation. EMERGENCY DEPARTMENT COURSE and DIFFERENTIAL DIAGNOSIS/MDM:   Vitals:    Vitals:    07/07/22 1655   BP: (!) 169/89   Pulse: 71   Resp: 16   Temp: 97 °F (36.1 °C)   TempSrc: Tympanic   SpO2: 99%         MDM  Number of Diagnoses or Management Options  Tenosynovitis of left wrist  Diagnosis management comments: Presents here for left wrist pain and swelling. X-ray shows no acute abnormalities. Patient's clinical examination consistent with de Quervain's tenosynovitis she has positive Chuckie on examination. Medicated with Toradol discharged home with prescription of prednisone and immobilization with a thumb spica splint. Encouraged to follow-up with orthopedist.  She verbalized understanding agrees to plan of care            Procedures    FINAL IMPRESSION      1.  Tenosynovitis of left wrist        DISPOSITION/PLAN   DISPOSITION Decision To Discharge 07/07/2022 05:26:36 PM      PATIENT REFERRED TO:  Wilson Osorio MD  [facility] [address]  [phone]            DISCHARGE MEDICATIONS:  New Prescriptions    PREDNISONE (DELTASONE) 50 MG TABLET    Take 1 tablet by mouth daily for 5 days              Summation      Patient Course:      ED Medications administered this visit:    Medications   ketorolac (TORADOL) injection 60 mg (has no administration in time range)       New Prescriptions from this visit:    New Prescriptions    PREDNISONE (DELTASONE) 50 MG TABLET    Take 1 tablet by mouth daily for 5 days       Follow-up:  [facility]sorio [facility]  [facility] [address] [address]  [phone]              Final Impression:   1.  Tenosynovitis of left wrist               (Please note that portions of this note were completed with a voice recognition program.  Efforts were made to edit the dictations but occasionally words are mis-transcribed.)         Erica Hoyos PA-C  07/07/22 1731       Erica Hoyos PA-C  07/07/22 1737

## 2022-08-18 ENCOUNTER — HOSPITAL ENCOUNTER (OUTPATIENT)
Age: 37
Setting detail: SPECIMEN
Discharge: HOME | End: 2022-08-18
Payer: COMMERCIAL

## 2022-08-18 ENCOUNTER — OFFICE VISIT (OUTPATIENT)
Dept: OBGYN | Age: 37
End: 2022-08-18
Payer: COMMERCIAL

## 2022-08-18 VITALS
DIASTOLIC BLOOD PRESSURE: 68 MMHG | WEIGHT: 152 LBS | SYSTOLIC BLOOD PRESSURE: 122 MMHG | BODY MASS INDEX: 26.93 KG/M2 | HEIGHT: 63 IN

## 2022-08-18 DIAGNOSIS — Z12.4 SCREENING FOR MALIGNANT NEOPLASM OF CERVIX: ICD-10-CM

## 2022-08-18 DIAGNOSIS — Z01.419 ENCOUNTER FOR ANNUAL ROUTINE GYNECOLOGICAL EXAMINATION: Primary | ICD-10-CM

## 2022-08-18 DIAGNOSIS — Z12.31 SCREENING MAMMOGRAM, ENCOUNTER FOR: ICD-10-CM

## 2022-08-18 PROCEDURE — G0145 SCR C/V CYTO,THINLAYER,RESCR: HCPCS

## 2022-08-18 PROCEDURE — 99395 PREV VISIT EST AGE 18-39: CPT | Performed by: ADVANCED PRACTICE MIDWIFE

## 2022-08-18 ASSESSMENT — ENCOUNTER SYMPTOMS
SHORTNESS OF BREATH: 0
BACK PAIN: 0
ABDOMINAL PAIN: 0

## 2022-08-25 LAB — CYTOLOGY REPORT: NORMAL

## 2022-10-18 ENCOUNTER — APPOINTMENT (OUTPATIENT)
Dept: ULTRASOUND IMAGING | Age: 37
End: 2022-10-18
Payer: COMMERCIAL

## 2022-10-18 ENCOUNTER — APPOINTMENT (OUTPATIENT)
Dept: CT IMAGING | Age: 37
End: 2022-10-18
Payer: COMMERCIAL

## 2022-10-18 ENCOUNTER — HOSPITAL ENCOUNTER (EMERGENCY)
Age: 37
Discharge: HOME | End: 2022-10-18
Attending: EMERGENCY MEDICINE
Payer: COMMERCIAL

## 2022-10-18 VITALS
TEMPERATURE: 98.3 F | SYSTOLIC BLOOD PRESSURE: 138 MMHG | HEART RATE: 75 BPM | DIASTOLIC BLOOD PRESSURE: 77 MMHG | OXYGEN SATURATION: 96 % | RESPIRATION RATE: 17 BRPM

## 2022-10-18 DIAGNOSIS — R10.32 ABDOMINAL PAIN, LEFT LOWER QUADRANT: Primary | ICD-10-CM

## 2022-10-18 DIAGNOSIS — R10.31 ABDOMINAL PAIN, RIGHT LOWER QUADRANT: ICD-10-CM

## 2022-10-18 LAB
ABSOLUTE EOS #: 0 K/UL (ref 0–0.44)
ABSOLUTE IMMATURE GRANULOCYTE: 0 K/UL (ref 0–0.3)
ABSOLUTE LYMPH #: 2.3 K/UL (ref 1.1–3.7)
ABSOLUTE MONO #: 1.38 K/UL (ref 0.1–1.2)
ALBUMIN SERPL-MCNC: 4.5 G/DL (ref 3.5–5.2)
ALBUMIN/GLOBULIN RATIO: 1.5 (ref 1–2.5)
ALP BLD-CCNC: 75 U/L (ref 35–104)
ALT SERPL-CCNC: 23 U/L (ref 5–33)
ANION GAP SERPL CALCULATED.3IONS-SCNC: 15 MMOL/L (ref 9–17)
AST SERPL-CCNC: 25 U/L
BACTERIA: ABNORMAL
BASOPHILS # BLD: 0 % (ref 0–2)
BASOPHILS ABSOLUTE: 0 K/UL (ref 0–0.2)
BILIRUB SERPL-MCNC: 0.3 MG/DL (ref 0.3–1.2)
BILIRUBIN URINE: NEGATIVE
BUN BLDV-MCNC: 18 MG/DL (ref 6–20)
BUN/CREAT BLD: 26 (ref 9–20)
CALCIUM SERPL-MCNC: 9.2 MG/DL (ref 8.6–10.4)
CHLORIDE BLD-SCNC: 100 MMOL/L (ref 98–107)
CO2: 19 MMOL/L (ref 20–31)
COLOR: YELLOW
CREAT SERPL-MCNC: 0.68 MG/DL (ref 0.5–0.9)
EOSINOPHILS RELATIVE PERCENT: 0 % (ref 1–4)
EPITHELIAL CELLS UA: ABNORMAL /HPF (ref 0–25)
GFR SERPL CREATININE-BSD FRML MDRD: >60 ML/MIN/1.73M2
GLUCOSE BLD-MCNC: 104 MG/DL (ref 70–99)
GLUCOSE URINE: NEGATIVE
HCG(URINE) PREGNANCY TEST: NEGATIVE
HCT VFR BLD CALC: 39.2 % (ref 36.3–47.1)
HEMOGLOBIN: 13.7 G/DL (ref 11.9–15.1)
IMMATURE GRANULOCYTES: 0 %
KETONES, URINE: NEGATIVE
LEUKOCYTE ESTERASE, URINE: ABNORMAL
LIPASE: 31 U/L (ref 13–60)
LYMPHOCYTES # BLD: 20 % (ref 24–43)
MCH RBC QN AUTO: 31.7 PG (ref 25.2–33.5)
MCHC RBC AUTO-ENTMCNC: 34.9 G/DL (ref 28.4–34.8)
MCV RBC AUTO: 90.7 FL (ref 82.6–102.9)
MONOCYTES # BLD: 12 % (ref 3–12)
MORPHOLOGY: NORMAL
MUCUS: ABNORMAL
NITRITE, URINE: NEGATIVE
NRBC AUTOMATED: 0 PER 100 WBC
PDW BLD-RTO: 12.1 % (ref 11.8–14.4)
PH UA: 6 (ref 5–9)
PLATELET # BLD: 214 K/UL (ref 138–453)
PMV BLD AUTO: 10.6 FL (ref 8.1–13.5)
POTASSIUM SERPL-SCNC: 3.7 MMOL/L (ref 3.7–5.3)
PROTEIN UA: NEGATIVE
RBC # BLD: 4.32 M/UL (ref 3.95–5.11)
RBC UA: ABNORMAL /HPF (ref 0–2)
SEG NEUTROPHILS: 68 % (ref 36–65)
SEGMENTED NEUTROPHILS ABSOLUTE COUNT: 7.82 K/UL (ref 1.5–8.1)
SODIUM BLD-SCNC: 134 MMOL/L (ref 135–144)
SPECIFIC GRAVITY UA: 1.01 (ref 1.01–1.02)
TOTAL PROTEIN: 7.6 G/DL (ref 6.4–8.3)
TURBIDITY: CLEAR
URINE HGB: ABNORMAL
UROBILINOGEN, URINE: NORMAL
WBC # BLD: 11.5 K/UL (ref 3.5–11.3)
WBC UA: ABNORMAL /HPF (ref 0–5)

## 2022-10-18 PROCEDURE — 2580000003 HC RX 258: Performed by: EMERGENCY MEDICINE

## 2022-10-18 PROCEDURE — 74177 CT ABD & PELVIS W/CONTRAST: CPT

## 2022-10-18 PROCEDURE — 76830 TRANSVAGINAL US NON-OB: CPT

## 2022-10-18 PROCEDURE — 80053 COMPREHEN METABOLIC PANEL: CPT

## 2022-10-18 PROCEDURE — 6360000002 HC RX W HCPCS: Performed by: EMERGENCY MEDICINE

## 2022-10-18 PROCEDURE — 81025 URINE PREGNANCY TEST: CPT

## 2022-10-18 PROCEDURE — 81001 URINALYSIS AUTO W/SCOPE: CPT

## 2022-10-18 PROCEDURE — 85025 COMPLETE CBC W/AUTO DIFF WBC: CPT

## 2022-10-18 PROCEDURE — 36415 COLL VENOUS BLD VENIPUNCTURE: CPT

## 2022-10-18 PROCEDURE — 6370000000 HC RX 637 (ALT 250 FOR IP): Performed by: EMERGENCY MEDICINE

## 2022-10-18 PROCEDURE — 93976 VASCULAR STUDY: CPT

## 2022-10-18 PROCEDURE — 96375 TX/PRO/DX INJ NEW DRUG ADDON: CPT

## 2022-10-18 PROCEDURE — 99285 EMERGENCY DEPT VISIT HI MDM: CPT

## 2022-10-18 PROCEDURE — 83690 ASSAY OF LIPASE: CPT

## 2022-10-18 PROCEDURE — 6360000004 HC RX CONTRAST MEDICATION: Performed by: EMERGENCY MEDICINE

## 2022-10-18 PROCEDURE — 96374 THER/PROPH/DIAG INJ IV PUSH: CPT

## 2022-10-18 RX ORDER — PROMETHAZINE HYDROCHLORIDE 25 MG/1
25 TABLET ORAL ONCE
Status: COMPLETED | OUTPATIENT
Start: 2022-10-18 | End: 2022-10-18

## 2022-10-18 RX ORDER — 0.9 % SODIUM CHLORIDE 0.9 %
1000 INTRAVENOUS SOLUTION INTRAVENOUS ONCE
Status: COMPLETED | OUTPATIENT
Start: 2022-10-18 | End: 2022-10-18

## 2022-10-18 RX ORDER — ONDANSETRON 2 MG/ML
4 INJECTION INTRAMUSCULAR; INTRAVENOUS ONCE
Status: COMPLETED | OUTPATIENT
Start: 2022-10-18 | End: 2022-10-18

## 2022-10-18 RX ORDER — MORPHINE SULFATE 2 MG/ML
2 INJECTION, SOLUTION INTRAMUSCULAR; INTRAVENOUS ONCE
Status: COMPLETED | OUTPATIENT
Start: 2022-10-18 | End: 2022-10-18

## 2022-10-18 RX ORDER — PROMETHAZINE HYDROCHLORIDE 12.5 MG/1
12.5 TABLET ORAL 4 TIMES DAILY PRN
Qty: 20 TABLET | Refills: 0 | Status: SHIPPED | OUTPATIENT
Start: 2022-10-18 | End: 2022-10-25

## 2022-10-18 RX ORDER — KETOROLAC TROMETHAMINE 30 MG/ML
30 INJECTION, SOLUTION INTRAMUSCULAR; INTRAVENOUS ONCE
Status: COMPLETED | OUTPATIENT
Start: 2022-10-18 | End: 2022-10-18

## 2022-10-18 RX ADMIN — ONDANSETRON 4 MG: 2 INJECTION INTRAMUSCULAR; INTRAVENOUS at 01:05

## 2022-10-18 RX ADMIN — PROMETHAZINE HYDROCHLORIDE 25 MG: 25 TABLET ORAL at 01:53

## 2022-10-18 RX ADMIN — SODIUM CHLORIDE 1000 ML: 9 INJECTION, SOLUTION INTRAVENOUS at 01:05

## 2022-10-18 RX ADMIN — IOPAMIDOL 75 ML: 755 INJECTION, SOLUTION INTRAVENOUS at 01:35

## 2022-10-18 RX ADMIN — KETOROLAC TROMETHAMINE 30 MG: 30 INJECTION, SOLUTION INTRAMUSCULAR at 03:09

## 2022-10-18 RX ADMIN — MORPHINE SULFATE 2 MG: 2 INJECTION, SOLUTION INTRAMUSCULAR; INTRAVENOUS at 01:05

## 2022-10-18 ASSESSMENT — ENCOUNTER SYMPTOMS
ABDOMINAL PAIN: 1
PHOTOPHOBIA: 0
VOICE CHANGE: 0
COLOR CHANGE: 0
NAUSEA: 1
SHORTNESS OF BREATH: 0
BACK PAIN: 0
CHEST TIGHTNESS: 0
TROUBLE SWALLOWING: 0

## 2022-10-18 ASSESSMENT — PAIN SCALES - GENERAL: PAINLEVEL_OUTOF10: 4

## 2022-10-18 NOTE — ED PROVIDER NOTES
[facility]  EMERGENCY DEPARTMENT ENCOUNTER      Pt Name: Antonio Gentile  MRN: [id number]  [place] 1985  Date of evaluation: 10/18/2022  Provider: Johnna Hunter DO    CHIEF COMPLAINT       Chief Complaint   Patient presents with    Abdominal Pain     RLQ, LLQ, sudden sharp onset PTA with nausea, pain started after intercourse         HISTORY OF PRESENT ILLNESS   (Location/Symptom, Timing/Onset, Context/Setting, Quality, Duration, Modifying Factors, Severity)  Note limiting factors. Antonio Gentile is a 40 y.o. female who presents to the emergency department with severe lower abdominal pain, right lower quadrant and left lower quadrant after sex. It was sudden and sharp. Patient had nausea as well. She reports that she was not having vigorous sex or unusual positions. She has never had this happen before. Reports no prior history of torsion. She does not feel she is pregnant. HPI    Nursing Notes were reviewed. REVIEW OF SYSTEMS    (2-9 systems for level 4, 10 or more for level 5)     Review of Systems   Constitutional:  Positive for activity change and fatigue. HENT:  Negative for trouble swallowing and voice change. Eyes:  Negative for photophobia and visual disturbance. Respiratory:  Negative for chest tightness and shortness of breath. Cardiovascular:  Negative for chest pain and palpitations. Gastrointestinal:  Positive for abdominal pain and nausea. Genitourinary:  Positive for pelvic pain. Negative for difficulty urinating, vaginal discharge and vaginal pain. Musculoskeletal:  Negative for back pain and joint swelling. Skin:  Negative for color change and pallor. Neurological:  Negative for dizziness and light-headedness. Psychiatric/Behavioral:  Negative for confusion and decreased concentration. The patient is nervous/anxious. Except as noted above the remainder of the review of systems was reviewed and negative.        PAST MEDICAL HISTORY Past Medical History:   Diagnosis Date    Abnormal Pap smear of cervix     Repeat pap yearly    Migraine     Recurrent UTI          SURGICAL HISTORY       Past Surgical History:   Procedure Laterality Date    CHOLECYSTECTOMY      DILATION AND CURETTAGE  09/21/2018    KNEE SURGERY Left     KNEE SURGERY Left     TONSILLECTOMY           CURRENT MEDICATIONS       Discharge Medication List as of 10/18/2022  3:16 AM          ALLERGIES     Pcn [penicillins], Percocet [oxycodone-acetaminophen], and Vicodin [hydrocodone-acetaminophen]    FAMILY HISTORY       Family History   Problem Relation Age of Onset    Hypertension Mother     Hypertension Father     Asthma Brother     No Known Problems Maternal Grandmother     Heart Disease Maternal Grandfather     Other Paternal Grandmother         High Cholesterol    Other Paternal Grandfather         High Cholesterol    Other Other         No family h/o breast cancer or stroke          SOCIAL HISTORY       Social History     Socioeconomic History    Marital status:    Tobacco Use    Smoking status: Former     Packs/day: 0.25     Types: Cigarettes    Smokeless tobacco: Never    Tobacco comments:     1 year ago   Vaping Use    Vaping Use: Never used   Substance and Sexual Activity    Alcohol use:  Yes     Alcohol/week: 0.0 standard drinks     Comment: occationally    Drug use: No    Sexual activity: Yes     Partners: Male     Birth control/protection: Condom     Social Determinants of Health     Financial Resource Strain: Low Risk     Difficulty of Paying Living Expenses: Not hard at all   Food Insecurity: No Food Insecurity    Worried About [employer] [employer] in the Last Year: Never true    [facility] in the Last Year: Never true       SCREENINGS         [place] Coma Scale  Eye Opening: Spontaneous  Best Verbal Response: Oriented  Best Motor Response: Obeys commands  [place] Coma Scale Score: 15                     CIWA Assessment  BP: 138/77  Heart Rate: 75 PHYSICAL EXAM    (up to 7 for level 4, 8 or more for level 5)     ED Triage Vitals   BP Temp Temp Source Heart Rate Resp SpO2 Height Weight   10/18/22 0021 10/18/22 0021 10/18/22 0021 10/18/22 0021 10/18/22 0022 10/18/22 0021 -- --   125/77 98.3 °F (36.8 °C) Tympanic 75 17 100 %         Physical Exam  Vitals and nursing note reviewed. Constitutional:       General: She is in acute distress. HENT:      Head: Normocephalic. Cardiovascular:      Rate and Rhythm: Normal rate and regular rhythm. Pulmonary:      Effort: Pulmonary effort is normal.   Abdominal:      Palpations: Abdomen is soft. Tenderness: There is abdominal tenderness in the right lower quadrant, suprapubic area and left lower quadrant. There is guarding. There is no rebound. Negative signs include Perales's sign and Rovsing's sign. Skin:     General: Skin is warm and dry. Neurological:      General: No focal deficit present. Mental Status: She is alert and oriented to person, place, and time. Motor: No weakness. Psychiatric:         Mood and Affect: Mood is anxious. DIAGNOSTIC RESULTS     EKG: All EKG's are interpreted by the Emergency Department Physician who either signs or Co-signs this chart in the absence of a cardiologist.        RADIOLOGY:   Non-plain film images such as CT, Ultrasound and MRI are read by the radiologist. Plain radiographic images are visualized and preliminarily interpreted by the emergency physician with the below findings:        Interpretation per the Radiologist below, if available at the time of this note:    CT ABDOMEN PELVIS W IV CONTRAST Additional Contrast? None   Final Result   Incidental note of possible minimal free fluid in the pelvic cul-de-sac. Otherwise unremarkable exam with prior cholecystectomy. US DUP ABD PEL RETRO SCROT LIMITED   Final Result   No evidence of torsion.       There appear to be multiple left ovarian follicles less than 2 cm and an   indeterminate cystic structure measuring 2-3 cm. Follow-up transvaginal   ultrasound is recommended in 6 weeks to document expected resolution. Mild amount of pelvic free fluid noted. US NON OB TRANSVAGINAL   Final Result   No evidence of torsion. There appear to be multiple left ovarian follicles less than 2 cm and an   indeterminate cystic structure measuring 2-3 cm. Follow-up transvaginal   ultrasound is recommended in 6 weeks to document expected resolution. Mild amount of pelvic free fluid noted. ED BEDSIDE ULTRASOUND:   Performed by ED Physician - none    LABS:  Labs Reviewed   CBC WITH AUTO DIFFERENTIAL - Abnormal; Notable for the following components:       Result Value    WBC 11.5 (*)     MCHC 34.9 (*)     Seg Neutrophils 68 (*)     Lymphocytes 20 (*)     Eosinophils % 0 (*)     Absolute Mono # 1.38 (*)     All other components within normal limits   COMPREHENSIVE METABOLIC PANEL - Abnormal; Notable for the following components:    Glucose 104 (*)     Bun/Cre Ratio 26 (*)     Sodium 134 (*)     CO2 19 (*)     All other components within normal limits   URINALYSIS WITH MICROSCOPIC - Abnormal; Notable for the following components:    Urine Hgb TRACE (*)     Leukocyte Esterase, Urine SMALL (*)     Bacteria, UA 1+ (*)     Mucus, UA TRACE (*)     All other components within normal limits   LIPASE   PREGNANCY, URINE       All other labs were within normal range or not returned as of this dictation. EMERGENCY DEPARTMENT COURSE and DIFFERENTIAL DIAGNOSIS/MDM:   Vitals:    Vitals:    10/18/22 0202 10/18/22 0217 10/18/22 0232 10/18/22 0247   BP:       Pulse:       Resp:       Temp:       TempSrc:       SpO2: 99% 98% 97% 96%           MDM   I was quite concerned for ovarian torsion however this was not proven to be true. The patient has some small amount of fluid in the cul-de-sac, which can be normal in females. No large cysts are seen.   The patient may be ovulating, may have had a cyst rupture at the same time as sexual intercourse. I did also discussed the case with , who feels this is the most likely case  I recommended that the patient follow-up with the gynecologist today. REASSESSMENT          CRITICAL CARE TIME       CONSULTS:  None    PROCEDURES:  Unless otherwise noted below, none     Procedures        FINAL IMPRESSION      1. Abdominal pain, left lower quadrant    2. Abdominal pain, right lower quadrant          DISPOSITION/PLAN   DISPOSITION Decision To Discharge 10/18/2022 03:14:52 AM      PATIENT REFERRED TO:  Yolanda Denise MD  [address] [facility] [address]  [phone]          [facility]  [address] [facility] [address]        [address]  [address]  [facility] [phone]          DISCHARGE MEDICATIONS:  Discharge Medication List as of 10/18/2022  3:16 AM        START taking these medications    Details   promethazine (PHENERGAN) 12.5 MG tablet Take 1 tablet by mouth 4 times daily as needed for Nausea, Disp-20 tablet, R-0Normal           Controlled Substances Monitoring:     No flowsheet data found.     (Please note that portions of this note were completed with a voice recognition program.  Efforts were made to edit the dictations but occasionally words are mis-transcribed.)    Mercedez Mcgill DO (electronically signed)  Attending Emergency Physician            Mercedez Mcgill DO  10/18/22 2239

## 2022-10-18 NOTE — DISCHARGE INSTRUCTIONS
Please follow-up with your doctor. Do not forget to get a repeat ultrasound of your ovaries especially for the left 1 in 6 weeks.   Avoid sex for few days    If you have any worsening symptoms return to this emergency room

## 2022-11-25 ENCOUNTER — TELEPHONE (OUTPATIENT)
Dept: OBGYN | Age: 37
End: 2022-11-25

## 2022-12-06 ENCOUNTER — OFFICE VISIT (OUTPATIENT)
Dept: OBGYN | Age: 37
End: 2022-12-06
Payer: COMMERCIAL

## 2022-12-06 VITALS
SYSTOLIC BLOOD PRESSURE: 118 MMHG | DIASTOLIC BLOOD PRESSURE: 74 MMHG | WEIGHT: 152.6 LBS | HEIGHT: 63 IN | BODY MASS INDEX: 27.04 KG/M2

## 2022-12-06 DIAGNOSIS — N94.10 DYSPAREUNIA, FEMALE: ICD-10-CM

## 2022-12-06 DIAGNOSIS — N39.0 RECURRENT UTI: Primary | ICD-10-CM

## 2022-12-06 DIAGNOSIS — R10.2 PELVIC PAIN: ICD-10-CM

## 2022-12-06 PROCEDURE — G8484 FLU IMMUNIZE NO ADMIN: HCPCS | Performed by: ADVANCED PRACTICE MIDWIFE

## 2022-12-06 PROCEDURE — G8427 DOCREV CUR MEDS BY ELIG CLIN: HCPCS | Performed by: ADVANCED PRACTICE MIDWIFE

## 2022-12-06 PROCEDURE — G8419 CALC BMI OUT NRM PARAM NOF/U: HCPCS | Performed by: ADVANCED PRACTICE MIDWIFE

## 2022-12-06 PROCEDURE — 99213 OFFICE O/P EST LOW 20 MIN: CPT | Performed by: ADVANCED PRACTICE MIDWIFE

## 2022-12-06 PROCEDURE — 1036F TOBACCO NON-USER: CPT | Performed by: ADVANCED PRACTICE MIDWIFE

## 2022-12-06 RX ORDER — NITROFURANTOIN 25; 75 MG/1; MG/1
100 CAPSULE ORAL 2 TIMES DAILY
Qty: 14 CAPSULE | Refills: 0 | Status: SHIPPED | OUTPATIENT
Start: 2022-12-06 | End: 2022-12-13

## 2022-12-06 NOTE — PROGRESS NOTES
PROBLEM VISIT     Date of service: 2022    Jacquelyn Jacome  Is a 40 y.o. , female    PT's PCP is: Edith Mccabe MD     : 1985                                             Subjective:       Patient's last menstrual period was 2022 (exact date). OB History    Para Term  AB Living   3 2 2   1 2   SAB IAB Ectopic Molar Multiple Live Births           0 2      # Outcome Date GA Lbr Doug/2nd Weight Sex Delivery Anes PTL Lv   3 Term 19 39w1d 07:53 / 00:20 8 lb 7.9 oz (3.854 kg) M Vag-Spont EPI N LOUISA   2 AB 18 10w4d          1 Term 08/21/10 39w2d  7 lb 10 oz (3.459 kg) F Vag-Spont None N LOUISA        Social History     Tobacco Use   Smoking Status Former    Packs/day: 0.25    Types: Cigarettes   Smokeless Tobacco Never   Tobacco Comments    1 year ago        Social History     Substance and Sexual Activity   Alcohol Use Yes    Alcohol/week: 0.0 standard drinks    Comment: occationally       Social History     Substance and Sexual Activity   Sexual Activity Yes    Partners: Male    Birth control/protection: Condom       Allergies: Pcn [penicillins], Percocet [oxycodone-acetaminophen], and Vicodin [hydrocodone-acetaminophen]    Chief Complaint   Patient presents with    Abdominal Pain     Follow up in house ultrasound. H/O abdominal pain. Last Yearly date:  2022    Last pap date and results: 2022 negative    Last HPV date and results: 2019 negative. Have you had a positive covid test: Yes    Have you had the covid immunization: Yes    PE:  Vital Signs  Blood pressure 118/74, height 5' 3\" (1.6 m), weight 152 lb 9.6 oz (69.2 kg), last menstrual period 2022, not currently breastfeeding. Estimated body mass index is 27.03 kg/m² as calculated from the following:    Height as of this encounter: 5' 3\" (1.6 m). Weight as of this encounter: 152 lb 9.6 oz (69.2 kg). No data recorded    .   NURSE: Odette NEGRETE    HPI: here for w/u of recurrent pelvic pain, dyspareunia;       PT denies fever, chills, nausea and vomiting       Objective: No acute distress  Excellent communications  Well-nourished     Results reviewed today:  Sono:  UTERUS: Heterogenous appearing retroverted uterus, WNL     ENDO: measures 5 mm     RT. OVARY: WNL     LT. OVARY: WNL     Trace free fluid noted lt adnexa     No results found for this visit on 12/06/22. Assessment and Plan          Diagnosis Orders   1. Recurrent UTI  nitrofurantoin, macrocrystal-monohydrate, (MACROBID) 100 MG capsule      2. Pelvic pain        3. Dyspareunia, female            without findings on exam or sono; patient would like to continue investigating as the pelvic pain and dyspareunia is effecting her life. Will return to discuss dx lap with Dr. Neil Cruz to r/o conditions such as endometriosis      I am having Josie Kilpatrick start on nitrofurantoin (macrocrystal-monohydrate). Return in about 1 month (around 1/6/2023) for consult Dr. Neil Cruz dx lap. There are no Patient Instructions on file for this visit.     Time spent 20 minutes      MONTRELL Padgett CNM,12/10/2022 10:30 PM

## 2023-01-12 ENCOUNTER — HOSPITAL ENCOUNTER (OUTPATIENT)
Dept: WOMENS IMAGING | Age: 38
Discharge: HOME | End: 2023-01-14
Payer: COMMERCIAL

## 2023-01-12 DIAGNOSIS — Z12.31 SCREENING MAMMOGRAM, ENCOUNTER FOR: ICD-10-CM

## 2023-01-12 PROCEDURE — 77063 BREAST TOMOSYNTHESIS BI: CPT

## 2023-01-31 ENCOUNTER — OFFICE VISIT (OUTPATIENT)
Dept: OBGYN | Age: 38
End: 2023-01-31
Payer: COMMERCIAL

## 2023-01-31 VITALS
WEIGHT: 156 LBS | DIASTOLIC BLOOD PRESSURE: 84 MMHG | SYSTOLIC BLOOD PRESSURE: 126 MMHG | HEIGHT: 63 IN | BODY MASS INDEX: 27.64 KG/M2

## 2023-01-31 DIAGNOSIS — N94.6 DYSMENORRHEA: ICD-10-CM

## 2023-01-31 DIAGNOSIS — R10.2 PELVIC PAIN: Primary | ICD-10-CM

## 2023-01-31 DIAGNOSIS — N94.10 DYSPAREUNIA, FEMALE: ICD-10-CM

## 2023-01-31 PROCEDURE — G8484 FLU IMMUNIZE NO ADMIN: HCPCS | Performed by: OBSTETRICS & GYNECOLOGY

## 2023-01-31 PROCEDURE — G8419 CALC BMI OUT NRM PARAM NOF/U: HCPCS | Performed by: OBSTETRICS & GYNECOLOGY

## 2023-01-31 PROCEDURE — 99213 OFFICE O/P EST LOW 20 MIN: CPT | Performed by: OBSTETRICS & GYNECOLOGY

## 2023-01-31 PROCEDURE — 1036F TOBACCO NON-USER: CPT | Performed by: OBSTETRICS & GYNECOLOGY

## 2023-01-31 PROCEDURE — G8427 DOCREV CUR MEDS BY ELIG CLIN: HCPCS | Performed by: OBSTETRICS & GYNECOLOGY

## 2023-01-31 NOTE — PROGRESS NOTES
DATE OF VISIT:  1/31/23    PATIENT NAME:  Natasha Zambrano     YOB: 1985    REASON FOR VISIT:    Chief Complaint   Patient presents with    Pelvic Pain     Patient notes that she has recurrent ovarian cysts and UTI's. She notes that US's have previously shown free fluid in the pelvis. She was told to discuss possible dx lap to determine if she has endometriosis. HISTORY OF PRESENT ILLNESS:  Pt with long hx of recurrent ovarian cysts and utis; states that she had ct that noted free fluid in pelvis; last usn showed normal ovaries but adenomyotic appearing uterus; disc'd pelvic pain; pt states it is worse      No LMP recorded. Vitals:    01/31/23 0857   BP: 126/84   Weight: 156 lb (70.8 kg)   Height: 5' 3\" (1.6 m)     Body mass index is 27.63 kg/m². Allergies   Allergen Reactions    Pcn [Penicillins]     Percocet [Oxycodone-Acetaminophen] Nausea And Vomiting    Vicodin [Hydrocodone-Acetaminophen] Nausea And Vomiting     Current Outpatient Medications   Medication Sig Dispense Refill    fluticasone (FLONASE) 50 MCG/ACT nasal spray 2 sprays by Each Nostril route daily 16 g 0     No current facility-administered medications for this visit. Social History     Socioeconomic History    Marital status:      Spouse name: None    Number of children: None    Years of education: None    Highest education level: None   Tobacco Use    Smoking status: Former     Packs/day: 0.25     Types: Cigarettes    Smokeless tobacco: Never    Tobacco comments:     1 year ago   Vaping Use    Vaping Use: Never used   Substance and Sexual Activity    Alcohol use:  Yes     Alcohol/week: 0.0 standard drinks     Comment: occationally    Drug use: No    Sexual activity: Yes     Partners: Male     Birth control/protection: Condom     Social Determinants of Health     Financial Resource Strain: Low Risk     Difficulty of Paying Living Expenses: Not hard at all   Food Insecurity: No Food Insecurity    Worried About Running Out of Food in the Last Year: Never true    Ran Out of Food in the Last Year: Never true       REVIEW OF SYSTEMS:  Review of Systems   Constitutional:  Negative for chills and fever. Genitourinary:  Positive for dyspareunia, dysuria (recurrent uti's), menstrual problem (some heavier) and pelvic pain. Negative for vaginal discharge. PHYSICAL EXAM:  /84   Ht 5' 3\" (1.6 m)   Wt 156 lb (70.8 kg)   BMI 27.63 kg/m²   Physical Exam  Constitutional:       Appearance: Normal appearance. HENT:      Head: Normocephalic and atraumatic. Eyes:      Extraocular Movements: Extraocular movements intact. Pupils: Pupils are equal, round, and reactive to light. Cardiovascular:      Rate and Rhythm: Normal rate. Pulmonary:      Effort: Pulmonary effort is normal.   Musculoskeletal:         General: Normal range of motion. Neurological:      Mental Status: She is alert and oriented to person, place, and time. Skin:     General: Skin is warm and dry. Psychiatric:         Mood and Affect: Mood normal.         Behavior: Behavior normal.     The patient, Louis Hays is a 40 y.o. female, was seen with a total time spent of 20 minutes for the visit on this date of service by the E/M provider. The time component had both face to face and non face to face time spent in determining the total time component. Counseling and education regarding her diagnosis listed below and her options regarding those diagnoses were also included in determining her time component. Diagnosis Orders   1. Pelvic pain        2. Dysmenorrhea        3. Dyspareunia, female             The patient had her preventative health maintenance recommendations and follow-up reviewed with her at the completion of her visit. ASSESSMENT:      1. Pelvic pain    2. Dysmenorrhea    3. Dyspareunia, female        PLAN:  No orders of the defined types were placed in this encounter. Return for  to coordinate. Electronically signed by Krysten Cole DO on 01/31/23

## 2023-04-03 ENCOUNTER — TELEPHONE (OUTPATIENT)
Dept: OBGYN CLINIC | Age: 38
End: 2023-04-03

## 2023-05-16 ENCOUNTER — OFFICE VISIT (OUTPATIENT)
Dept: OBGYN | Age: 38
End: 2023-05-16
Payer: COMMERCIAL

## 2023-05-16 VITALS
BODY MASS INDEX: 27.82 KG/M2 | SYSTOLIC BLOOD PRESSURE: 122 MMHG | WEIGHT: 157 LBS | DIASTOLIC BLOOD PRESSURE: 82 MMHG | HEIGHT: 63 IN

## 2023-05-16 DIAGNOSIS — N94.6 DYSMENORRHEA: ICD-10-CM

## 2023-05-16 DIAGNOSIS — N94.10 DYSPAREUNIA, FEMALE: ICD-10-CM

## 2023-05-16 DIAGNOSIS — R10.2 PELVIC PAIN: Primary | ICD-10-CM

## 2023-05-16 PROCEDURE — 1036F TOBACCO NON-USER: CPT | Performed by: OBSTETRICS & GYNECOLOGY

## 2023-05-16 PROCEDURE — G8419 CALC BMI OUT NRM PARAM NOF/U: HCPCS | Performed by: OBSTETRICS & GYNECOLOGY

## 2023-05-16 PROCEDURE — G8427 DOCREV CUR MEDS BY ELIG CLIN: HCPCS | Performed by: OBSTETRICS & GYNECOLOGY

## 2023-05-16 PROCEDURE — 99213 OFFICE O/P EST LOW 20 MIN: CPT | Performed by: OBSTETRICS & GYNECOLOGY

## 2023-05-16 NOTE — H&P (VIEW-ONLY)
Insecurity: No Food Insecurity    Worried About Running Out of Food in the Last Year: Never true    Ran Out of Food in the Last Year: Never true   Transportation Needs: Unknown    Lack of Transportation (Non-Medical): No   Housing Stability: Unknown    Unstable Housing in the Last Year: No       REVIEW OF SYSTEMS:  Review of Systems   Constitutional:  Negative for chills and fever. Genitourinary:  Positive for dyspareunia, menstrual problem (sometimes heavy with clotting) and pelvic pain. Negative for dysuria and vaginal discharge. PHYSICAL EXAM:  /82   Ht 5' 3\" (1.6 m)   Wt 157 lb (71.2 kg)   LMP 05/01/2023 (Exact Date)   BMI 27.81 kg/m²   Physical Exam  Constitutional:       Appearance: Normal appearance. HENT:      Head: Normocephalic and atraumatic. Cardiovascular:      Rate and Rhythm: Normal rate. Pulmonary:      Effort: Pulmonary effort is normal.   Musculoskeletal:         General: Normal range of motion. Neurological:      Mental Status: She is alert and oriented to person, place, and time. Skin:     General: Skin is warm and dry. Psychiatric:         Mood and Affect: Mood normal.         Behavior: Behavior normal.     The patient, Jonh Lo is a 40 y.o. female, was seen with a total time spent of 20 minutes for the visit on this date of service by the E/M provider. The time component had both face to face and non face to face time spent in determining the total time component. Counseling and education regarding her diagnosis listed below and her options regarding those diagnoses were also included in determining her time component. Diagnosis Orders   1. Pelvic pain        2. Dysmenorrhea        3. Dyspareunia, female             The patient had her preventative health maintenance recommendations and follow-up reviewed with her at the completion of her visit. ASSESSMENT:      1. Pelvic pain    2. Dysmenorrhea    3.  Dyspareunia, female        PLAN:  No orders of

## 2023-05-16 NOTE — PROGRESS NOTES
DATE OF VISIT:  5/16/23    PATIENT NAME:  Andres Salmeron     YOB: 1985    REASON FOR VISIT:    Chief Complaint   Patient presents with    Pre-op Exam     Pt presents today for pre op DX Lap, lysis adhesions, endometriosis. 5/19/23. HISTORY OF PRESENT ILLNESS:  PT with worsening pelvic pain, especially with cycles; pain with intercourse as well; has had ovarian cysts; usn suggestive of adenomyosis and pt interested in knowing if she has endometriosis; disc'd dx lap, poss lysis of adhesions, poss ablation of endometriosis; r/b/a reviewed; restrictions and recovery disc'd      Patient's last menstrual period was 05/01/2023 (exact date). Vitals:    05/16/23 1046   BP: 122/82   Weight: 157 lb (71.2 kg)   Height: 5' 3\" (1.6 m)     Body mass index is 27.81 kg/m². Allergies   Allergen Reactions    Pcn [Penicillins]     Percocet [Oxycodone-Acetaminophen] Nausea And Vomiting    Vicodin [Hydrocodone-Acetaminophen] Nausea And Vomiting     Current Outpatient Medications   Medication Sig Dispense Refill    silver sulfADIAZINE (SILVADENE) 1 % cream Apply topically daily until clear (Patient not taking: Reported on 5/4/2023) 50 g 0     No current facility-administered medications for this visit. Social History     Socioeconomic History    Marital status:      Spouse name: None    Number of children: None    Years of education: None    Highest education level: None   Tobacco Use    Smoking status: Former     Packs/day: 0.25     Types: Cigarettes    Smokeless tobacco: Never    Tobacco comments:     1 year ago   Vaping Use    Vaping Use: Never used   Substance and Sexual Activity    Alcohol use:  Yes     Alcohol/week: 0.0 standard drinks     Comment: occationally    Drug use: No    Sexual activity: Yes     Partners: Male     Birth control/protection: Condom     Social Determinants of Health     Financial Resource Strain: Low Risk     Difficulty of Paying Living Expenses: Not hard at all   Food

## 2023-05-18 ENCOUNTER — ANESTHESIA EVENT (OUTPATIENT)
Dept: OPERATING ROOM | Age: 38
End: 2023-05-18
Payer: COMMERCIAL

## 2023-05-19 ENCOUNTER — HOSPITAL ENCOUNTER (OUTPATIENT)
Age: 38
Setting detail: OUTPATIENT SURGERY
Discharge: HOME | End: 2023-05-19
Attending: OBSTETRICS & GYNECOLOGY | Admitting: OBSTETRICS & GYNECOLOGY
Payer: COMMERCIAL

## 2023-05-19 ENCOUNTER — ANESTHESIA (OUTPATIENT)
Dept: OPERATING ROOM | Age: 38
End: 2023-05-19
Payer: COMMERCIAL

## 2023-05-19 VITALS
SYSTOLIC BLOOD PRESSURE: 139 MMHG | HEART RATE: 55 BPM | RESPIRATION RATE: 16 BRPM | TEMPERATURE: 97.1 F | WEIGHT: 155 LBS | OXYGEN SATURATION: 96 % | DIASTOLIC BLOOD PRESSURE: 78 MMHG | BODY MASS INDEX: 27.46 KG/M2 | HEIGHT: 63 IN

## 2023-05-19 LAB — HCG(URINE) PREGNANCY TEST: NEGATIVE

## 2023-05-19 PROCEDURE — 2500000003 HC RX 250 WO HCPCS: Performed by: NURSE ANESTHETIST, CERTIFIED REGISTERED

## 2023-05-19 PROCEDURE — 3600000014 HC SURGERY LEVEL 4 ADDTL 15MIN: Performed by: OBSTETRICS & GYNECOLOGY

## 2023-05-19 PROCEDURE — 6360000002 HC RX W HCPCS

## 2023-05-19 PROCEDURE — 3700000000 HC ANESTHESIA ATTENDED CARE: Performed by: OBSTETRICS & GYNECOLOGY

## 2023-05-19 PROCEDURE — 6360000002 HC RX W HCPCS: Performed by: NURSE ANESTHETIST, CERTIFIED REGISTERED

## 2023-05-19 PROCEDURE — 2709999900 HC NON-CHARGEABLE SUPPLY: Performed by: OBSTETRICS & GYNECOLOGY

## 2023-05-19 PROCEDURE — 7100000010 HC PHASE II RECOVERY - FIRST 15 MIN: Performed by: OBSTETRICS & GYNECOLOGY

## 2023-05-19 PROCEDURE — 2580000003 HC RX 258: Performed by: NURSE ANESTHETIST, CERTIFIED REGISTERED

## 2023-05-19 PROCEDURE — 64488 TAP BLOCK BI INJECTION: CPT | Performed by: NURSE ANESTHETIST, CERTIFIED REGISTERED

## 2023-05-19 PROCEDURE — A4216 STERILE WATER/SALINE, 10 ML: HCPCS | Performed by: NURSE ANESTHETIST, CERTIFIED REGISTERED

## 2023-05-19 PROCEDURE — 81025 URINE PREGNANCY TEST: CPT

## 2023-05-19 PROCEDURE — 7100000000 HC PACU RECOVERY - FIRST 15 MIN: Performed by: OBSTETRICS & GYNECOLOGY

## 2023-05-19 PROCEDURE — 3600000004 HC SURGERY LEVEL 4 BASE: Performed by: OBSTETRICS & GYNECOLOGY

## 2023-05-19 PROCEDURE — 7100000001 HC PACU RECOVERY - ADDTL 15 MIN: Performed by: OBSTETRICS & GYNECOLOGY

## 2023-05-19 PROCEDURE — 6370000000 HC RX 637 (ALT 250 FOR IP): Performed by: NURSE ANESTHETIST, CERTIFIED REGISTERED

## 2023-05-19 PROCEDURE — 2720000010 HC SURG SUPPLY STERILE: Performed by: OBSTETRICS & GYNECOLOGY

## 2023-05-19 PROCEDURE — 2500000003 HC RX 250 WO HCPCS

## 2023-05-19 PROCEDURE — 3700000001 HC ADD 15 MINUTES (ANESTHESIA): Performed by: OBSTETRICS & GYNECOLOGY

## 2023-05-19 PROCEDURE — 7100000011 HC PHASE II RECOVERY - ADDTL 15 MIN: Performed by: OBSTETRICS & GYNECOLOGY

## 2023-05-19 PROCEDURE — 49320 DIAG LAPARO SEPARATE PROC: CPT | Performed by: OBSTETRICS & GYNECOLOGY

## 2023-05-19 RX ORDER — MIDAZOLAM HYDROCHLORIDE 1 MG/ML
INJECTION INTRAMUSCULAR; INTRAVENOUS PRN
Status: DISCONTINUED | OUTPATIENT
Start: 2023-05-19 | End: 2023-05-19 | Stop reason: SDUPTHER

## 2023-05-19 RX ORDER — LIDOCAINE HYDROCHLORIDE 20 MG/ML
INJECTION, SOLUTION EPIDURAL; INFILTRATION; INTRACAUDAL; PERINEURAL PRN
Status: DISCONTINUED | OUTPATIENT
Start: 2023-05-19 | End: 2023-05-19 | Stop reason: SDUPTHER

## 2023-05-19 RX ORDER — DEXAMETHASONE SODIUM PHOSPHATE 4 MG/ML
INJECTION, SOLUTION INTRA-ARTICULAR; INTRALESIONAL; INTRAMUSCULAR; INTRAVENOUS; SOFT TISSUE PRN
Status: DISCONTINUED | OUTPATIENT
Start: 2023-05-19 | End: 2023-05-19 | Stop reason: SDUPTHER

## 2023-05-19 RX ORDER — DEXAMETHASONE SODIUM PHOSPHATE 10 MG/ML
INJECTION, SOLUTION INTRAMUSCULAR; INTRAVENOUS PRN
Status: DISCONTINUED | OUTPATIENT
Start: 2023-05-19 | End: 2023-05-19 | Stop reason: SDUPTHER

## 2023-05-19 RX ORDER — KETOROLAC TROMETHAMINE 10 MG/1
10 TABLET, FILM COATED ORAL EVERY 6 HOURS PRN
Qty: 20 TABLET | Refills: 0 | Status: SHIPPED | OUTPATIENT
Start: 2023-05-19 | End: 2024-05-18

## 2023-05-19 RX ORDER — SODIUM CHLORIDE 9 MG/ML
INJECTION, SOLUTION INTRAVENOUS PRN
Status: DISCONTINUED | OUTPATIENT
Start: 2023-05-19 | End: 2023-05-19 | Stop reason: HOSPADM

## 2023-05-19 RX ORDER — SODIUM CHLORIDE 0.9 % (FLUSH) 0.9 %
5-40 SYRINGE (ML) INJECTION EVERY 12 HOURS SCHEDULED
Status: DISCONTINUED | OUTPATIENT
Start: 2023-05-19 | End: 2023-05-19 | Stop reason: HOSPADM

## 2023-05-19 RX ORDER — METRONIDAZOLE 500 MG/100ML
500 INJECTION, SOLUTION INTRAVENOUS
Status: COMPLETED | OUTPATIENT
Start: 2023-05-19 | End: 2023-05-19

## 2023-05-19 RX ORDER — PROPOFOL 10 MG/ML
INJECTION, EMULSION INTRAVENOUS PRN
Status: DISCONTINUED | OUTPATIENT
Start: 2023-05-19 | End: 2023-05-19 | Stop reason: SDUPTHER

## 2023-05-19 RX ORDER — METOCLOPRAMIDE HYDROCHLORIDE 5 MG/ML
10 INJECTION INTRAMUSCULAR; INTRAVENOUS
Status: DISCONTINUED | OUTPATIENT
Start: 2023-05-19 | End: 2023-05-19 | Stop reason: HOSPADM

## 2023-05-19 RX ORDER — FENTANYL CITRATE 50 UG/ML
50 INJECTION, SOLUTION INTRAMUSCULAR; INTRAVENOUS EVERY 5 MIN PRN
Status: DISCONTINUED | OUTPATIENT
Start: 2023-05-19 | End: 2023-05-19 | Stop reason: HOSPADM

## 2023-05-19 RX ORDER — SODIUM CHLORIDE 9 MG/ML
INJECTION INTRAVENOUS PRN
Status: DISCONTINUED | OUTPATIENT
Start: 2023-05-19 | End: 2023-05-19 | Stop reason: SDUPTHER

## 2023-05-19 RX ORDER — ACETAMINOPHEN 325 MG/1
650 TABLET ORAL ONCE
Status: COMPLETED | OUTPATIENT
Start: 2023-05-19 | End: 2023-05-19

## 2023-05-19 RX ORDER — SODIUM CHLORIDE 0.9 % (FLUSH) 0.9 %
5-40 SYRINGE (ML) INJECTION PRN
Status: DISCONTINUED | OUTPATIENT
Start: 2023-05-19 | End: 2023-05-19 | Stop reason: HOSPADM

## 2023-05-19 RX ORDER — PROCHLORPERAZINE EDISYLATE 5 MG/ML
5 INJECTION INTRAMUSCULAR; INTRAVENOUS
Status: DISCONTINUED | OUTPATIENT
Start: 2023-05-19 | End: 2023-05-19 | Stop reason: HOSPADM

## 2023-05-19 RX ORDER — ROCURONIUM BROMIDE 10 MG/ML
INJECTION, SOLUTION INTRAVENOUS PRN
Status: DISCONTINUED | OUTPATIENT
Start: 2023-05-19 | End: 2023-05-19 | Stop reason: SDUPTHER

## 2023-05-19 RX ORDER — FENTANYL CITRATE 50 UG/ML
INJECTION, SOLUTION INTRAMUSCULAR; INTRAVENOUS PRN
Status: DISCONTINUED | OUTPATIENT
Start: 2023-05-19 | End: 2023-05-19 | Stop reason: SDUPTHER

## 2023-05-19 RX ORDER — NEOSTIGMINE METHYLSULFATE 1 MG/ML
INJECTION, SOLUTION INTRAVENOUS PRN
Status: DISCONTINUED | OUTPATIENT
Start: 2023-05-19 | End: 2023-05-19 | Stop reason: SDUPTHER

## 2023-05-19 RX ORDER — ONDANSETRON 2 MG/ML
INJECTION INTRAMUSCULAR; INTRAVENOUS PRN
Status: DISCONTINUED | OUTPATIENT
Start: 2023-05-19 | End: 2023-05-19 | Stop reason: SDUPTHER

## 2023-05-19 RX ORDER — LEVOFLOXACIN 5 MG/ML
500 INJECTION, SOLUTION INTRAVENOUS
Status: COMPLETED | OUTPATIENT
Start: 2023-05-19 | End: 2023-05-19

## 2023-05-19 RX ORDER — DIMENHYDRINATE 50 MG/1
50 TABLET ORAL ONCE
Status: COMPLETED | OUTPATIENT
Start: 2023-05-19 | End: 2023-05-19

## 2023-05-19 RX ORDER — ROPIVACAINE HYDROCHLORIDE 5 MG/ML
INJECTION, SOLUTION EPIDURAL; INFILTRATION; PERINEURAL PRN
Status: DISCONTINUED | OUTPATIENT
Start: 2023-05-19 | End: 2023-05-19 | Stop reason: SDUPTHER

## 2023-05-19 RX ORDER — GLYCOPYRROLATE 0.2 MG/ML
INJECTION INTRAMUSCULAR; INTRAVENOUS PRN
Status: DISCONTINUED | OUTPATIENT
Start: 2023-05-19 | End: 2023-05-19 | Stop reason: SDUPTHER

## 2023-05-19 RX ORDER — SODIUM CHLORIDE, SODIUM LACTATE, POTASSIUM CHLORIDE, CALCIUM CHLORIDE 600; 310; 30; 20 MG/100ML; MG/100ML; MG/100ML; MG/100ML
INJECTION, SOLUTION INTRAVENOUS CONTINUOUS
Status: DISCONTINUED | OUTPATIENT
Start: 2023-05-19 | End: 2023-05-19 | Stop reason: HOSPADM

## 2023-05-19 RX ADMIN — FENTANYL CITRATE 75 MCG: 50 INJECTION INTRAMUSCULAR; INTRAVENOUS at 15:03

## 2023-05-19 RX ADMIN — SODIUM CHLORIDE, POTASSIUM CHLORIDE, SODIUM LACTATE AND CALCIUM CHLORIDE: 600; 310; 30; 20 INJECTION, SOLUTION INTRAVENOUS at 13:43

## 2023-05-19 RX ADMIN — ROCURONIUM BROMIDE 30 MG: 10 INJECTION, SOLUTION INTRAVENOUS at 15:03

## 2023-05-19 RX ADMIN — PROPOFOL 150 MG: 10 INJECTION, EMULSION INTRAVENOUS at 15:03

## 2023-05-19 RX ADMIN — ROPIVACAINE HYDROCHLORIDE 45 ML: 5 INJECTION EPIDURAL; INFILTRATION; PERINEURAL at 14:45

## 2023-05-19 RX ADMIN — DIMENHYDRINATE 50 MG: 50 TABLET ORAL at 13:31

## 2023-05-19 RX ADMIN — MIDAZOLAM 2 MG: 1 INJECTION INTRAMUSCULAR; INTRAVENOUS at 14:41

## 2023-05-19 RX ADMIN — GLYCOPYRROLATE 0.4 MG: 0.2 INJECTION INTRAMUSCULAR; INTRAVENOUS at 15:37

## 2023-05-19 RX ADMIN — ONDANSETRON 4 MG: 2 INJECTION INTRAMUSCULAR; INTRAVENOUS at 15:22

## 2023-05-19 RX ADMIN — LIDOCAINE HYDROCHLORIDE 100 MG: 20 INJECTION, SOLUTION EPIDURAL; INFILTRATION; INTRACAUDAL; PERINEURAL at 15:03

## 2023-05-19 RX ADMIN — FENTANYL CITRATE 25 MCG: 50 INJECTION INTRAMUSCULAR; INTRAVENOUS at 14:41

## 2023-05-19 RX ADMIN — ACETAMINOPHEN 650 MG: 325 TABLET ORAL at 13:31

## 2023-05-19 RX ADMIN — LEVOFLOXACIN 500 MG: 5 INJECTION, SOLUTION INTRAVENOUS at 15:16

## 2023-05-19 RX ADMIN — DEXAMETHASONE SODIUM PHOSPHATE 4 MG: 4 INJECTION, SOLUTION INTRAMUSCULAR; INTRAVENOUS at 15:22

## 2023-05-19 RX ADMIN — DEXAMETHASONE SODIUM PHOSPHATE 10 MG: 10 INJECTION, SOLUTION INTRAMUSCULAR; INTRAVENOUS at 14:45

## 2023-05-19 RX ADMIN — FENTANYL CITRATE 50 MCG: 50 INJECTION INTRAMUSCULAR; INTRAVENOUS at 15:34

## 2023-05-19 RX ADMIN — NEOSTIGMINE METHYLSULFATE 3 MG: 1 INJECTION, SOLUTION INTRAVENOUS at 15:37

## 2023-05-19 RX ADMIN — METRONIDAZOLE 500 MG: 500 INJECTION, SOLUTION INTRAVENOUS at 14:52

## 2023-05-19 RX ADMIN — SODIUM CHLORIDE 40 ML: 9 INJECTION INTRAMUSCULAR; INTRAVENOUS; SUBCUTANEOUS at 14:45

## 2023-05-19 ASSESSMENT — PAIN SCALES - GENERAL
PAINLEVEL_OUTOF10: 0

## 2023-05-19 NOTE — ANESTHESIA POSTPROCEDURE EVALUATION
Department of Anesthesiology  Postprocedure Note    Patient: Alek Ortiz  MRN: [id number]  YOB: 1985  Date of evaluation: 5/19/2023      Procedure Summary     Date: 05/19/23 Room / Location: [address] [place] [address]    Anesthesia Start: 1459 Anesthesia Stop: 3574    Procedure: LAPAROSCOPY EXPLORATORY Diagnosis:       Chronic pelvic pain in female      (PELVIC PAIN,   DYSPAREUNIA,    DYSMENORRHEA)    Surgeons: Deejay Arshad DO Responsible Provider: MONTRELL Dunham CRNA    Anesthesia Type: general ASA Status: 1          Anesthesia Type: No value filed.     Sylvie Phase I: Sylvie Score: 10    Sylvie Phase II:        Anesthesia Post Evaluation    Patient location during evaluation: bedside  Patient participation: complete - patient participated  Level of consciousness: awake and alert  Pain score: 0  Airway patency: patent  Nausea & Vomiting: no nausea and no vomiting  Complications: no  Cardiovascular status: hemodynamically stable  Respiratory status: acceptable  Hydration status: stable

## 2023-05-19 NOTE — DISCHARGE INSTRUCTIONS
SAME DAY SURGERY DISCHARGE INSTRUCTIONS    1. Do not drive or operate hazardous machinery for 24 hours. 2.  Do not make important personal or business decisions for 24 hours. 3.  Do not drink alcoholic beverages for 24 hours. 4.  Do not smoke tobacco products for 24 hours. 5.  Patient should not be left alone for 12-24 hours following surgical procedure. 6.  Eat light foods (Jell-O, soups, etc....) and drink plenty of fluids (water, Sprite, etc...) up to 8 glasses per day, as you can tolerate. 7.  If your bandages become soaked with bright red blood, place another dressing pad over your bandages. (DO NOT remove original bandage.)  Call your surgeon for further instructions. A small amount of bright red blood is to be expected. 8.  Wash hands before and after incision care. It is important to practice good personal hygiene during the post op period. 9.  You may remove your dressing the morning following surgery; leave the steri-strips in place, they will fall off on their own. 10.  If no drainage from incisions you may shower. 11.  Limit your activities for 24 hours. Do not engage in heavy work until your surgeon gives you permission. DO NOT lift anything heavier than 10 pounds. You may go up & down stairs and do any activity that can be done comfortably. 12.  Report the following signs or any questions regarding your physical condition to your surgeon immediately:    Excessive swelling of, or around the wound area. Redness or pus-like drainage    Temperature of 100 degrees (F) or above. Excessive pain. If unable to urinate 4 hours after surgery. If bleeding at surgery site continues after 5-10 minutes of pressure. 13.  Pain Control:  Take pain meds as prescribed. You may use over the counter meds like Acetaminophen or Ibuprofen if not part of the meds already prescribed.     While on narcotic pain meds DO NOT drive, operate machinery or make business

## 2023-05-19 NOTE — ANESTHESIA PROCEDURE NOTES
Peripheral Block    Patient location during procedure: pre-op  Reason for block: post-op pain management and at surgeon's request  Start time: 5/19/2023 2:41 PM  End time: 5/19/2023 2:48 PM  Staffing  Resident/CRNA: Humza Go. Jj Parisi APRN - CRNA  Preanesthetic Checklist  Completed: patient identified, IV checked, site marked, risks and benefits discussed, surgical/procedural consents, equipment checked, pre-op evaluation, timeout performed, anesthesia consent given, oxygen available, monitors applied/VS acknowledged, fire risk safety assessment completed and verbalized and blood product R/B/A discussed and consented  Peripheral Block   Patient position: supine  Prep: ChloraPrep  Provider prep: mask and sterile gloves  Patient monitoring: continuous pulse ox, IV access and responsive to questions  Block type: Rectus sheath and TAP  Laterality: bilateral  Injection technique: single-shot  Guidance: ultrasound guided    Needle   Needle type:  Other   Needle gauge: 22 G  Needle localization: ultrasound guidance  Catheter type: closed end  Test dose: negative  Needle length: 8 cmOther needle type: pajunk sonotap  Assessment   Injection assessment: negative aspiration for heme, no paresthesia on injection, local visualized surrounding nerve on ultrasound and no intravascular symptoms  Paresthesia pain: none  Slow fractionated injection: yes  Hemodynamics: stable  Real-time US image taken/store: yes  Outcomes: uncomplicated and patient tolerated procedure well

## 2023-05-19 NOTE — ANESTHESIA PRE PROCEDURE
Department of Anesthesiology  Preprocedure Note       Name:  Chanelle Graves   Age:  40 y.o.  :  1985                                          MRN:  [id number]         Date:  2023      Surgeon: Sushil Rendon):  Olu Pena DO    Procedure: Procedure(s):  LAPAROSCOPY EXPLORATORY-LYSIS OF ADHESIONS, ABLATION OF ENDOMETRIOSIS    Medications prior to admission:   Prior to Admission medications    Medication Sig Start Date End Date Taking? Authorizing Provider   Probiotic Product (PROBIOTIC-10 PO) Take 1 tablet by mouth   Yes Historical Provider, MD   Multiple Vitamins-Minerals (MULTIVITAL-M PO) Take 1 tablet by mouth   Yes Historical Provider, MD   silver sulfADIAZINE (SILVADENE) 1 % cream Apply topically daily until clear  Patient not taking: Reported on 2023 2/3/23   MONTRELL Núñez - CNP       Current medications:    Current Facility-Administered Medications   Medication Dose Route Frequency Provider Last Rate Last Admin    lactated ringers IV soln infusion   IntraVENous Continuous MONTRELL Tolentino - OMAR 100 mL/hr at 23 1343 New Bag at 23 1343    sodium chloride flush 0.9 % injection 5-40 mL  5-40 mL IntraVENous 2 times per day Aric Crate, PA-C        sodium chloride flush 0.9 % injection 5-40 mL  5-40 mL IntraVENous PRN Aric Crate, PA-C        0.9 % sodium chloride infusion   IntraVENous PRN Aric Crate, PA-C        metronidazole (FLAGYL) 500 mg in 0.9% NaCl 100 mL IVPB premix  500 mg IntraVENous On Call to PETROS HortaC        And    levoFLOXacin (LEVAQUIN) 500 MG/100ML infusion 500 mg  500 mg IntraVENous On Call to Bee Ritter, PA-C           Allergies:     Allergies   Allergen Reactions    Pcn [Penicillins]     Percocet [Oxycodone-Acetaminophen] Nausea And Vomiting    Vicodin [Hydrocodone-Acetaminophen] Nausea And Vomiting       Problem List:    Patient Active Problem List   Diagnosis Code    Normal delivery

## 2023-05-19 NOTE — PROGRESS NOTES
Discharge Criteria    Inpatients must meet Criteria 1 through 7. All other patients are either YES or N/A. If a NO is chosen then Anesthesia or Surgeon must be notified. 1.  Minimum 30 minutes after last dose of sedative medication. Yes      2. Systolic BP between 90 - 969. Diastolic BP between 60 - 90. Yes      3. Pulse between 60 - 120    Yes      4. Respirations between 8 - 25. Yes      5. SpO2 92% - 100%. Yes      6. Able to cough and swallow or return to baseline function. Yes      7. Alert and oriented or return to baseline mental status. Yes      8. Demonstrates controlled, coordinated movements, ambulates with steady gait, or return to baseline activity function. Yes      9. Minimal or no pain or nausea, or at a level tolerable and acceptable to patient. Yes      10. Takes and retains oral fluids as allowed. Yes      11. Procedural / perioperative site stable. Minimal or no bleeding. Yes          12. If GI endoscopy procedure, minimal or no abdominal distention or passing flatus. N/A      13. Written discharge instructions and emergency telephone number provided. Yes      14. Accompanied by a responsible adult.     Yes

## 2023-09-23 ENCOUNTER — HOSPITAL ENCOUNTER (EMERGENCY)
Age: 38
Discharge: HOME | End: 2023-09-23
Attending: STUDENT IN AN ORGANIZED HEALTH CARE EDUCATION/TRAINING PROGRAM
Payer: COMMERCIAL

## 2023-09-23 VITALS
DIASTOLIC BLOOD PRESSURE: 71 MMHG | HEART RATE: 82 BPM | TEMPERATURE: 97.9 F | RESPIRATION RATE: 16 BRPM | WEIGHT: 158 LBS | SYSTOLIC BLOOD PRESSURE: 134 MMHG | OXYGEN SATURATION: 97 % | BODY MASS INDEX: 27.99 KG/M2

## 2023-09-23 DIAGNOSIS — G44.89 OTHER HEADACHE SYNDROME: Primary | ICD-10-CM

## 2023-09-23 LAB
EKG ATRIAL RATE: 110 BPM
EKG P AXIS: 59 DEGREES
EKG P-R INTERVAL: 158 MS
EKG Q-T INTERVAL: 328 MS
EKG QRS DURATION: 86 MS
EKG QTC CALCULATION (BAZETT): 443 MS
EKG R AXIS: 18 DEGREES
EKG T AXIS: 46 DEGREES
EKG VENTRICULAR RATE: 110 BPM

## 2023-09-23 PROCEDURE — 6360000002 HC RX W HCPCS: Performed by: STUDENT IN AN ORGANIZED HEALTH CARE EDUCATION/TRAINING PROGRAM

## 2023-09-23 PROCEDURE — 96374 THER/PROPH/DIAG INJ IV PUSH: CPT

## 2023-09-23 PROCEDURE — 93010 ELECTROCARDIOGRAM REPORT: CPT | Performed by: FAMILY MEDICINE

## 2023-09-23 PROCEDURE — 99284 EMERGENCY DEPT VISIT MOD MDM: CPT

## 2023-09-23 PROCEDURE — 96375 TX/PRO/DX INJ NEW DRUG ADDON: CPT

## 2023-09-23 PROCEDURE — 93005 ELECTROCARDIOGRAM TRACING: CPT | Performed by: STUDENT IN AN ORGANIZED HEALTH CARE EDUCATION/TRAINING PROGRAM

## 2023-09-23 RX ORDER — PROCHLORPERAZINE EDISYLATE 5 MG/ML
10 INJECTION INTRAMUSCULAR; INTRAVENOUS ONCE
Status: COMPLETED | OUTPATIENT
Start: 2023-09-23 | End: 2023-09-23

## 2023-09-23 RX ORDER — KETOROLAC TROMETHAMINE 30 MG/ML
30 INJECTION, SOLUTION INTRAMUSCULAR; INTRAVENOUS ONCE
Status: COMPLETED | OUTPATIENT
Start: 2023-09-23 | End: 2023-09-23

## 2023-09-23 RX ORDER — DIPHENHYDRAMINE HYDROCHLORIDE 50 MG/ML
25 INJECTION INTRAMUSCULAR; INTRAVENOUS ONCE
Status: COMPLETED | OUTPATIENT
Start: 2023-09-23 | End: 2023-09-23

## 2023-09-23 RX ORDER — DEXAMETHASONE SODIUM PHOSPHATE 4 MG/ML
4 INJECTION, SOLUTION INTRA-ARTICULAR; INTRALESIONAL; INTRAMUSCULAR; INTRAVENOUS; SOFT TISSUE ONCE
Status: COMPLETED | OUTPATIENT
Start: 2023-09-23 | End: 2023-09-23

## 2023-09-23 RX ORDER — LABETALOL HYDROCHLORIDE 5 MG/ML
5 INJECTION, SOLUTION INTRAVENOUS ONCE
Status: COMPLETED | OUTPATIENT
Start: 2023-09-23 | End: 2023-09-23

## 2023-09-23 RX ADMIN — KETOROLAC TROMETHAMINE 30 MG: 30 INJECTION, SOLUTION INTRAMUSCULAR; INTRAVENOUS at 02:38

## 2023-09-23 RX ADMIN — PROCHLORPERAZINE EDISYLATE 10 MG: 5 INJECTION INTRAMUSCULAR; INTRAVENOUS at 02:40

## 2023-09-23 RX ADMIN — DEXAMETHASONE SODIUM PHOSPHATE 4 MG: 4 INJECTION INTRA-ARTICULAR; INTRALESIONAL; INTRAMUSCULAR; INTRAVENOUS; SOFT TISSUE at 02:39

## 2023-09-23 RX ADMIN — DIPHENHYDRAMINE HYDROCHLORIDE 25 MG: 50 INJECTION, SOLUTION INTRAMUSCULAR; INTRAVENOUS at 02:39

## 2023-09-23 RX ADMIN — LABETALOL HYDROCHLORIDE 5 MG: 5 INJECTION INTRAVENOUS at 02:38

## 2023-09-23 ASSESSMENT — LIFESTYLE VARIABLES
HOW OFTEN DO YOU HAVE A DRINK CONTAINING ALCOHOL: NEVER
HOW MANY STANDARD DRINKS CONTAINING ALCOHOL DO YOU HAVE ON A TYPICAL DAY: PATIENT DOES NOT DRINK

## 2023-09-23 NOTE — ED PROVIDER NOTES
[address] [facility] ED  Emergency Department Encounter  Emergency Medicine Attending     Pt Stephanie Roa  MRN: [id number]  [address] [facility] 1985  Date of evaluation: 9/23/23  PCP:  John Alcaraz MD  Note Started: 2:44 AM EDT      CHIEF COMPLAINT       Chief Complaint   Patient presents with    Headache     Reports waking up from sleep with head pain & feeling like she was clenching her jaw in her sleep    Hypertension       HISTORY OF PRESENT ILLNESS  (Location/Symptom, Timing/Onset, Context/Setting, Quality, Duration, Modifying Factors, Severity.)      Zulema Mike is a 40 y.o. female who presents with head pain that woke her from sleep with a feeling of though she was clenching her jaw in addition patient states that she is felt like her blood pressure has been elevated as she has a family history of it but never had a history herself. States that she has a history of migraines but typically it will wake her up out of sleep but denies there being any throbbing or beating pain. No thunderclap onset and no vision changes. PAST MEDICAL / SURGICAL / SOCIAL / FAMILY HISTORY      has a past medical history of Abnormal Pap smear of cervix, Migraine, and Recurrent UTI.       has a past surgical history that includes Cholecystectomy; knee surgery (Left); knee surgery (Left); Tonsillectomy; Dilation & curettage (09/21/2018); and laparoscopy (N/A, 5/19/2023). Social History     Socioeconomic History    Marital status:      Spouse name: Not on file    Number of children: Not on file    Years of education: Not on file    Highest education level: Not on file   Occupational History    Not on file   Tobacco Use    Smoking status: Former     Packs/day: .25     Types: Cigarettes    Smokeless tobacco: Never    Tobacco comments:     1 year ago   Vaping Use    Vaping Use: Never used   Substance and Sexual Activity    Alcohol use:  Yes     Alcohol/week: 0.0 standard drinks of alcohol

## 2024-03-19 ENCOUNTER — HOSPITAL ENCOUNTER (OUTPATIENT)
Dept: WOMENS IMAGING | Age: 39
Discharge: HOME | End: 2024-03-21
Payer: COMMERCIAL

## 2024-03-19 VITALS — BODY MASS INDEX: 27.12 KG/M2 | HEIGHT: 64 IN

## 2024-03-19 DIAGNOSIS — Z12.31 VISIT FOR SCREENING MAMMOGRAM: ICD-10-CM

## 2024-03-19 PROCEDURE — 77063 BREAST TOMOSYNTHESIS BI: CPT

## 2025-03-20 DIAGNOSIS — Z12.31 ENCOUNTER FOR SCREENING MAMMOGRAM FOR BREAST CANCER: Primary | ICD-10-CM

## 2025-04-03 ENCOUNTER — HOSPITAL ENCOUNTER (OUTPATIENT)
Age: 40
Setting detail: SPECIMEN
Discharge: HOME | End: 2025-04-03
Payer: COMMERCIAL

## 2025-04-03 ENCOUNTER — OFFICE VISIT (OUTPATIENT)
Dept: OBGYN | Age: 40
End: 2025-04-03
Payer: COMMERCIAL

## 2025-04-03 VITALS
BODY MASS INDEX: 25.45 KG/M2 | HEIGHT: 63 IN | DIASTOLIC BLOOD PRESSURE: 80 MMHG | WEIGHT: 143.6 LBS | SYSTOLIC BLOOD PRESSURE: 128 MMHG

## 2025-04-03 DIAGNOSIS — Z01.419 ENCOUNTER FOR ANNUAL ROUTINE GYNECOLOGICAL EXAMINATION: Primary | ICD-10-CM

## 2025-04-03 DIAGNOSIS — Z12.4 SCREENING FOR MALIGNANT NEOPLASM OF CERVIX: ICD-10-CM

## 2025-04-03 DIAGNOSIS — Z12.31 SCREENING MAMMOGRAM, ENCOUNTER FOR: ICD-10-CM

## 2025-04-03 PROCEDURE — 99395 PREV VISIT EST AGE 18-39: CPT | Performed by: ADVANCED PRACTICE MIDWIFE

## 2025-04-03 PROCEDURE — G0145 SCR C/V CYTO,THINLAYER,RESCR: HCPCS

## 2025-04-03 NOTE — PROGRESS NOTES
YEARLY PHYSICAL    Date of service: 4/3/2025    Mitzy Cespedes  Is a 39 y.o.  , female    PT's PCP is: Dalia Olmedo APRN - CNP     : 1985                                             Subjective:       Patient's last menstrual period was 2025 (approximate).     Are your menses regular: yes    OB History    Para Term  AB Living   3 2 2  1 2   SAB IAB Ectopic Molar Multiple Live Births       0 2      # Outcome Date GA Lbr Doug/2nd Weight Sex Type Anes PTL Lv   3 Term 19 39w1d 07:53 / 00:20 3.854 kg (8 lb 7.9 oz) M Vag-Spont EPI N LOUISA   2 AB 18 10w4d          1 Term 08/21/10 39w2d  3.459 kg (7 lb 10 oz) F Vag-Spont None N LOUISA        Social History     Tobacco Use   Smoking Status Former    Current packs/day: 0.25    Types: Cigarettes   Smokeless Tobacco Never   Tobacco Comments    1 year ago        Social History     Substance and Sexual Activity   Alcohol Use Yes    Alcohol/week: 0.0 standard drinks of alcohol    Comment: occationally       Family History   Problem Relation Age of Onset    Hypertension Mother     Hypertension Father     Asthma Brother     No Known Problems Maternal Grandmother     Heart Disease Maternal Grandfather        Any family history of breast or ovarian cancer: No    Any family history of blood clots: No    Allergies: Pcn [penicillins], Percocet [oxycodone-acetaminophen], and Vicodin [hydrocodone-acetaminophen]      Current Outpatient Medications:     Tirzepatide 5 MG/0.5ML SOAJ, Inject into the skin once a week, Disp: , Rfl:     amLODIPine (NORVASC) 2.5 MG tablet, Take 1 tablet by mouth daily, Disp: 30 tablet, Rfl: 0    Probiotic Product (PROBIOTIC-10 PO), Take 1 tablet by mouth, Disp: , Rfl:     Multiple Vitamins-Minerals (MULTIVITAL-M PO), Take 1 tablet by mouth, Disp: , Rfl:     Social History     Substance and Sexual Activity   Sexual Activity Yes    Partners: Male

## 2025-04-04 ASSESSMENT — ENCOUNTER SYMPTOMS
SHORTNESS OF BREATH: 0
BACK PAIN: 0
ABDOMINAL PAIN: 0

## 2025-04-15 ENCOUNTER — RESULTS FOLLOW-UP (OUTPATIENT)
Dept: OBGYN | Age: 40
End: 2025-04-15

## 2025-04-15 LAB — CYTOLOGY REPORT: NORMAL

## (undated) DEVICE — SYRINGE MED 10ML LUERLOCK TIP W/O SFTY DISP

## (undated) DEVICE — SOLUTION IV IRRIG POUR BRL 0.9% SODIUM CHL 2F7124

## (undated) DEVICE — DEVICE MANIP L330MM DIA4.5MM UTER DISP INJ ZINNANTI KRONNER

## (undated) DEVICE — PREMIUM DRY TRAY LF: Brand: MEDLINE INDUSTRIES, INC.

## (undated) DEVICE — COVER,MAYO STAND,STERILE: Brand: MEDLINE

## (undated) DEVICE — 3M™ TEGADERM™ +PAD FILM DRESSING WITH NON-ADHERENT PAD, 3587, 3-1/2 IN X 4-1/8 IN (9 CM X 10.5 CM), 25/CAR, 4 CAR/CS: Brand: 3M™ TEGADERM™

## (undated) DEVICE — PREP PAD BNS: Brand: CONVERTORS

## (undated) DEVICE — MAGNETIC IMPLANT S1000-00: Brand: SOPHONO™

## (undated) DEVICE — PAD,SANITARY,11 IN,MAXI,N-STRL,IND WRAP: Brand: MEDLINE

## (undated) DEVICE — TROCAR ENDOSCP L110MM DIA5MM STD CANN DIL BLDELSS BLNT TIP

## (undated) DEVICE — GLOVE SURG SZ 65 L12IN FNGR THK87MIL WHT LTX FREE

## (undated) DEVICE — APPLICATOR MEDICATED 26 CC SOLUTION HI LT ORNG CHLORAPREP

## (undated) DEVICE — [HIGH FLOW INSUFFLATOR,  DO NOT USE IF PACKAGE IS DAMAGED,  KEEP DRY,  KEEP AWAY FROM SUNLIGHT,  PROTECT FROM HEAT AND RADIOACTIVE SOURCES.]: Brand: PNEUMOSURE

## (undated) DEVICE — SOLUTION SCRB 4OZ 4% CHG H2O AIDED FOR PREOPERATIVE SKIN

## (undated) DEVICE — COVER LT HNDL BLU PLAS

## (undated) DEVICE — ELECTRODE PT RET AD L9FT HI MOIST COND ADH HYDRGEL CORDED

## (undated) DEVICE — ENDOSCOPIC KIT CLN SWAB MICROFIBER CLTH SCP CLEANOR DISP

## (undated) DEVICE — BLADELESS OBTURATOR: Brand: WECK VISTA

## (undated) DEVICE — CATHETER URETH 16FR L16IN RED RUB INTMIT ROB MOD BARDX

## (undated) DEVICE — TROCAR: Brand: KII FIOS FIRST ENTRY

## (undated) DEVICE — HYPODERMIC SAFETY NEEDLE: Brand: MAGELLAN

## (undated) DEVICE — WARMER SCP 2 ANTIFOG LAP DISP

## (undated) DEVICE — DRAPE,UTILTY,TAPE,15X26, 4EA/PK: Brand: MEDLINE

## (undated) DEVICE — GLOVE SURG SZ 65 THK91MIL LTX FREE SYN POLYISOPRENE

## (undated) DEVICE — SUTURE MCRYL SZ 4-0 L27IN ABSRB UD L19MM PS-2 1/2 CIR PRIM Y426H

## (undated) DEVICE — SUTURE VCRL SZ 3-0 L27IN ABSRB UD L26MM SH 1/2 CIR J416H

## (undated) DEVICE — LAVH-LF: Brand: MEDLINE INDUSTRIES, INC.

## (undated) DEVICE — GOWN,AURORA,NONRNF,XL,30/CS: Brand: MEDLINE